# Patient Record
Sex: MALE | Race: WHITE | Employment: FULL TIME | URBAN - METROPOLITAN AREA
[De-identification: names, ages, dates, MRNs, and addresses within clinical notes are randomized per-mention and may not be internally consistent; named-entity substitution may affect disease eponyms.]

---

## 2017-01-02 ENCOUNTER — RADIANT APPOINTMENT (OUTPATIENT)
Dept: GENERAL RADIOLOGY | Facility: CLINIC | Age: 42
End: 2017-01-02
Attending: ORTHOPAEDIC SURGERY
Payer: COMMERCIAL

## 2017-01-02 ENCOUNTER — OFFICE VISIT (OUTPATIENT)
Dept: ORTHOPEDICS | Facility: CLINIC | Age: 42
End: 2017-01-02

## 2017-01-02 ENCOUNTER — THERAPY VISIT (OUTPATIENT)
Dept: OCCUPATIONAL THERAPY | Facility: CLINIC | Age: 42
End: 2017-01-02
Payer: COMMERCIAL

## 2017-01-02 VITALS
SYSTOLIC BLOOD PRESSURE: 118 MMHG | OXYGEN SATURATION: 96 % | BODY MASS INDEX: 31.97 KG/M2 | HEART RATE: 93 BPM | WEIGHT: 236 LBS | DIASTOLIC BLOOD PRESSURE: 84 MMHG | HEIGHT: 72 IN

## 2017-01-02 DIAGNOSIS — S62.323A CLOSED DISPLACED FRACTURE OF SHAFT OF THIRD METACARPAL BONE OF LEFT HAND, INITIAL ENCOUNTER: Primary | ICD-10-CM

## 2017-01-02 DIAGNOSIS — M79.645 PAIN OF FINGER OF LEFT HAND: ICD-10-CM

## 2017-01-02 DIAGNOSIS — M79.642 PAIN OF LEFT HAND: Primary | ICD-10-CM

## 2017-01-02 PROCEDURE — 29130 APPL FINGER SPLINT STATIC: CPT | Mod: GO | Performed by: OCCUPATIONAL THERAPIST

## 2017-01-02 PROCEDURE — 26600 TREAT METACARPAL FRACTURE: CPT | Mod: LT | Performed by: ORTHOPAEDIC SURGERY

## 2017-01-02 PROCEDURE — 97165 OT EVAL LOW COMPLEX 30 MIN: CPT | Mod: GO | Performed by: OCCUPATIONAL THERAPIST

## 2017-01-02 PROCEDURE — 73130 X-RAY EXAM OF HAND: CPT | Mod: LT | Performed by: RADIOLOGY

## 2017-01-02 ASSESSMENT — PAIN SCALES - GENERAL: PAINLEVEL: NO PAIN (0)

## 2017-01-02 NOTE — PROGRESS NOTES
Hand Therapy Initial Evaluation    Current Date:  1/2/2017       Diagnosis: Left 3rd metacarpal fracture  DOI:12/28/16  MD follow up: 1 week       Subjective:  Ramesh Galvez is a 41 year old right hand dominant male.     Patient reports symptoms of pain, stiffness/loss of motion, weakness/loss of strength and edema of the left hand which occurred due to fall onto hand while carrying a recliner down stairs. Since onset symptoms are Unchanged  Special tests:  x-ray at ER and today.  Previous treatment: splinted in ER until today.    General health as reported by patient is good.  Pertinent medical history includes: None noted.  Medical allergies:none.  Surgical history: orthopedic: scaphoid R wrist, femur repair, C7 spinal surgery other: sinuses.  Medication history: anti-inflammatory, anti-depressants, high blood pressure.    Current occupation is unemployed at this time, worked as .  Starting at [employer] soon.    Currently not working.  Job Tasks: computer work    Barriers include:none  Prior functional level:  no limitations    Additional Occupational Profile Information (patterns of daily living, interests, values and needs): all activity with L hand is difficult due to edema, pain, and lack of motion including buttons, dressing, cooking, driving.    Functional Outcome Measure:   Upper Extremity Functional Index Score:  SCORE:   Column Totals: /80: 30   (A lower score indicates greater disability.)    Objective:  Pain Level Report  VAS(0-10) 1/2/2017   At Rest: 1/10   With Use: 7/10      Report of Pain:  Location:  hand  Pain Quality:  Aching and Sharp  Frequency: intermittent    Pain is worst:  daytime or nighttime  Exacerbated by:  motion  Relieved by:  Ibuprofen, pain med at night  Progression:  Unchanged  Edema:  MILD to moderate in dorsal hand and volar thenars  Skin: Bruising noted in volar thenars  Sensation: WNL throughout all nerve distributions; per patient  report    ROM: Not tested, but IP's are WFL    Strength: Not tested, contraindicated    Assessment:  Patient presents with symptoms consistent with diagnosis of Left 3rd metacarpal fracture,  with conservative intervention.     Patient's limitations or Problem List includes:  Pain, Decreased ROM/motion and Increased edema of the left hand which interferes with the patient's ability to perform Self Care Tasks (dressing, eating), Work Tasks, Sleep Patterns, Recreational Activities, Household Chores and Driving  as compared to previous level of function.    Rehab Potential:  Excellent - Return to full activity, no limitations    Patient will benefit from skilled Occupational Therapy to increase stability of fracture with splinting and decrease pain and edema to return to previous activity level and resume normal daily tasks and to reach their rehab potential.    Barriers to Learning:  No barrier    Communication Issues:  Patient appears to be able to clearly communicate and understand verbal and written communication and follow directions correctly.    Assessment of Occupational Performance:  1-3 Performance Deficits  Identified Performance Deficits: dressing, household chores , lifting/carrying     Clinical Decision Making (Complexity): Low complexity    Treatment Explanation:    The following has been discussed with the patient:  RX ordered/plan of care  Anticipated outcomes  Possible risks and side effects    Plan:  Frequency:  1 X week, once daily  Duration:  1 x visit for splint, keep chart open for 4 weeks pending need for additional visits/updated MD order    Treatment Plan:   Orthotic Fabrication:  Static orthosis and Hand based orthosis - per consult with Dr. Haywood (MCP's flexed to ~50/60 degrees, hand-based ok)  Discharge Plan:  Achieve all LTG.  Independent in home treatment program.  Reach maximal therapeutic benefit.    Home Exercise Program:  Splint full time, off for brief hygiene/skin checks  only  Tubigrip sleeve to hand, elevation for edema    Next Visit:  Splint checks as needed  Await any updated MD orders if needed

## 2017-01-02 NOTE — MR AVS SNAPSHOT
After Visit Summary   1/2/2017    Ramesh Galvez    MRN: [id number]           Patient Information     Date Of Birth          1975        Visit Information        Provider Department      1/2/2017 11:45 AM Jennifer Haywood MD [facility]        Today's Diagnoses     Pain of finger of left hand    -  1       Care Instructions      [facility]  [address] [place] [address]  Phone [phone]  Fax [phone]    SURGICAL INFORMATION & INSTRUCTIONS  Dr. Jennifer Haywood  Name of Surgery: Left hand surgery  Date of Surgery: Possibly 1/12/17  A surgery scheduler will contact you within 1 week of your office visit with the surgeon.  If you don't receive a phone call, please call [phone].   Time of Surgery:   Arrival Time:    Please arrive early so that we can prepare you for surgery.  Please note that scheduled times may change.  A nurse will call you before surgery to confirm your instructions.    Location of Surgery:   ?  [facility]  5th Floor   [address] [place] [address]  Phone [phone]  Fax [phone]  [email or web address]  ?  [address] [address]  Phone [phone]  Fax [phone]  [email or web address]    Prior to surgery  ?   Call your insurance company   Ask if you need pre-approval for your surgery.  If pre-approval is needed, please call our surgery scheduler for assistance with the pre-approval process.   If you do not have insurance, please let us know.       ?   Schedule an appointment with a Primary Care Doctor for a Pre-Op Physical.  This should be done within 30 days of surgery  If you do not have a primary care doctor, you may call [facility] at [phone], for an appointment.    Please have your office note and any labs or tests faxed to the facility where you are having surgery.   If your doctor  gives you a copy, please bring it with you on the day of surgery.      Tell your doctor if you have any of the following:   - Allergy to latex or rubber (latex and rubber gloves are often used in medical care).  - If you are taking any medicines (including aspirin or blood thinners), vitamins, or herbal products. You may need to stop taking some medicines before surgery.  - Medical conditions (allergies, diabetes, or heart disease, for example).  - A pacemaker, implanted defibrillator or any other implanted devices.  If you do, please bring the ID card with you on the day of surgery.  - If you use tobacco, quit, or at least cut down.  Smoking delays healing and can increase your risk of infection after surgery.  Ask your doctor how you can quit smoking.    ?   Pre-Op Phone Call  You will receive a pre-op phone call 1-3 days before surgery to review your eating and drinking restrictions, review medications, and confirm surgery times.      ?   7-10 days BEFORE surgery  ? Stop taking aspirin, Plavix or aspirin products 10 days before surgery or as directed by your doctor.    ? Stop taking non-steroidal anti-inflammatory medications (Motrin, Naprosyn, Ibuprofen, Aleve, Advil) 1 week before surgery or as directed by your surgeon.  This will reduce the risk of bleeding during surgery.    ? Stop taking fish oil (Omega-3-fatty acid) 1 week before surgery.    ? It is OK to take acetaminophen (Tylenol) up until 2 hours prior to surgery.    ? Take prescription medications as directed by your doctor.  Discuss which medications to take or hold prior to your surgery, with your primary care doctor.      ? If you have diabetes, ask your primary care doctor or endocrinologist how you should take your medication(s).  ?   24 hours BEFORE surgery    ? Stop drinking alcohol (beer, wine, liquor) at least 24-hours before and after your surgery.     ?   Evening BEFORE surgery      You may eat a normal meal the night before surgery, but eat  nothing after midnight.       Take a shower - to help wash away bacteria (germs) from your skin.  It s normal to have bacteria on your skin and skin protects us from these germs.  When you have surgery, we cut the skin.  Sometimes germs get into the cuts and cause infection (illness caused by germs).  By following the showering instructions and using the special soap, you will lower the number of germs on your skin.  This decreases your chance of an infection.    o Do not shave within 12 inches of your incision (surgical cut) area for at least 3 days before surgery.  Shaving can make small cuts in the skin.  This puts you at a higher risk of infection.  Items you will need for each shower:     Newly washed towel    4 ounces of one of the recommended soaps    Follow these instructions, the evening before surgery       Wash your hair and body with your regular shampoo and soap. Make sure you rinse the shampoo and soap from your hair and body.      Using clean hands, apply about 2 ounces of soap gently on your skin from the neck to your toes. Use on your groin area last. Do not use this soap on your face or head. If you get any soap in your eyes, ears or mouth, rinse right away.       Repeat step 2. It is very important to let the soap stay on your skin for at least 1 minute.       Rinse well and dry off using a clean towel.  If you feel any tingling, itching or other irritation, rinse right away. It is normal to feel some coolness on the skin after using the antiseptic soap. Your skin may feel a bit dry after the shower, but do not use any lotions, creams or moisturizers. Do not use hair spray or other products in your hair.      Dress in freshly washed clothes or pajamas. Use fresh pillowcases and sheets on your bed.    ?   Day of Surgery    If you are very overweight, pregnant, diabetic, have a hiatal hernia or acid reflux, stop drinking clear liquids 6 hours before surgery.      You may drink clear liquids up to 6  hours before surgery or as directed by your surgeon.  Clear liquids include:  Water, Pedialyte, Gatorade, apple juice and liquids you can read through.  Avoid anything orange or red.    Do NOT drink: milk, coffee, liquids that have pulp, orange juice, and lemonade or tomato juice.     Do NOT chew gum, chew tobacco or suck on hard candy.    Take another shower  Follow these instructions:        Wash your hair and body with your regular shampoo and soap. Make sure you rinse the shampoo and soap from your hair and body.      Using clean hands, apply about 2 ounces of soap gently on your skin from the neck to your toes. Use on your groin area last. Do not use this soap on your face or head. If you get any soap in your eyes, ears or mouth, rinse right away.       Repeat step 2. It is very important to let the soap stay on your skin for at least 1 minute.       Rinse well and dry off using a clean towel.  If you feel any tingling, itching or other irritation, rinse right away. It is normal to feel some coolness on the skin after using the antiseptic soap. Your skin may feel a bit dry after the shower, but do not use any lotions, creams or moisturizers. Do not use hair spray or other products in your hair.      Dress in freshly washed clothes.    Do not wear deodorant, cologne, lotion, makeup, nail polish or jewelry to surgery.  ?   If there is any change in your health PRIOR to your surgery, please contact your surgeon's office  Such as a fever, cold, cough, rash, etc     ? Arrange for someone to drive you home after surgery.    will need to be a responsible adult (18 years or older) that will provide transportation to and from surgery and stay in the waiting room during your surgery. You may not drive yourself or take public transportation to and from surgery.    ?   Arrange for someone to stay with you for 24 hours after you go home.   This person must be a responsible adult (18 years or older).    ?   Bring these  items to the hospital/surgery center:   ? Insurance card(s)  ? Money for parking and co-pays, if needed  ? A list of all the medications you take, including vitamins, minerals, herbs and over the counter medications.    ? A copy of your Advance Health Care Directive, if you have one.  This tells us what treatment(s) you would or would not want, and who would make health care decisions, if you could no longer speak for yourself.    ? A case for glasses, contact lenses, hearing aids or dentures.   ? Your inhaler or CPAP machine, if you use these at home    ?   Leave extra cash, jewelry and other valuables at home.       ?   Other information:     Sleep Apnea: Let your nurse know if you have a history of sleep apnea, only if you are having surgery at the [facility].    When you arrive for surgery  When you get to the surgery center/hospital, you will:    Check in. If you are under age 18, you must be with a parent or legal guardian.      Sign consent forms, if you haven t already. These forms state that you know the risks and benefits of surgery. When you sign the forms, you give us permission to do the surgery. Do not sign them unless you understand what will happen during and after your surgery. If you have any questions about your surgery, ask to speak with your doctor before you sign the forms. If you don t understand the answers, ask again.      Receive a copy of the Patient s Bill of Rights. If you do not receive a copy, please ask for one.      Change into hospital clothes. Your belongings will be placed in a bag. We will return them to you after surgery.      Meet with the anesthesia provider. He or she will tell you what kind of anesthesia (medicine) will be used to keep you comfortable during surgery.      Remember: it s okay to remind doctors and nurses to wash their hands before touching you.      In most cases, your surgeon will use a marker to write his or her initials on the surgery site.  This ensures that the exact site is operated on.      For safety reasons, we will ask you the same questions many times. For example, we may ask your name and birth date over and over again.      Friends and family can stay with you until it s time for surgery. While you re in surgery, they will be in the waiting area. Please note that cell phones are not allowed in some patient care areas.      If you have questions about what will happen in the operating room, talk to your care team.      You will meet with an anesthesiologist, before your surgery.  He or she may reference types of anesthesia commonly used for surgeries:     General:  This involves the use of an IV for injection of medication and anesthesia. You are put into a sleep and have a breathing tube to assist you with breathing.    Sedation:  You are asleep, but not so deply that you need a breathing tube.     Local or Regional: a nerve is injected to numb the surgical area.    Spinal: you are numbed from the waist down with medicine injected into your back.    Femoral Nerve Block:  Anesthesia injected into the groin of leg which you are having surgery on.      After surgery  We will move you to a recovery room, where we will watch you closely. If you have any pain or discomfort, tell your nurse. He or she will try to make you comfortable.      If you are staying overnight, we will move you to your hospital room after you are awake.      If you are going home, we will move you to another room. Friends and family may be able to join you. The length of time you spend in recovery depend on the type of medicine you received, your medical condition, the type of surgery you had, or your response to the anesthesia given during your procedure.      When you are discharged from the recovery room, the nurses will review instructions with you and your caregiver.      Please wash your hands every time you touch the wound or change bandages or dressings.      Do not  submerge the wound in water.  You may not use a bathtub or hot tub until the wound is closed. The wait time frame is generally 2-3 weeks, but any open area can be a source of incoming bacteria, so it is better to be on the safe side and avoid water submersion until your wound is fully healed.  Keep all dressings clean and dry.       If you had surgery on your arm or leg, elevate it as much as possible to help reduce swelling.      You may put ice on the surgical area for comfort, keeping ice on area for up to 20 minutes then off for 40 minutes.  You may do this the first few days after surgery to help reduce pain and swelling.        Drink at least 8-10 glasses of liquid each day to help your body heal.      Keep your lungs clear by coughing and taking deep breaths every couple hours.  This is especially important the first 48 hours after surgery.      Notify your doctor if you have any of the following:   o Fever of 101 F or higher  o Numbness and/or tingling  o Increased pain, redness or swelling  o Drainage from wound  o Prolonged or uncontrolled bleeding  o Difficulty with movement    Follow-up Appointments, in Clinic  If you don't already have an appointment scheduled, please call to set up an appointment at [phone].    ?   Post-Op appointments with provider.    ?   Possible Hand therapy appointment [phone]    Dealing with pain  A nurse will check your comfort level often during your stay. He or she will work with you to manage your pain.  It s expected that you will have pain after surgery.  Our goal is to reduce or minimize pain by:     Remember pain is real. There are many ways to control pain. We can help you decide what works best for you.    Ask for pain medicine when you need it.  Don t try to  tough it out --this can make you feel worse. Always take your medicine as ordered.    Medicine doesn t work the same for everyone. If your medicine isn t working, tell your nurse. There may be other  medicines or treatments we can try.    Medication Refills.  If you need refills on your pain medication, please call the clinic as soon as possible.  It may take 72-business hours to obtain a refill.  Refills must be picked up at the [facility] or mailed to a pharmacy of your choice.      It is expected that you will wean off the pain medications in a timely manner.     Constipation is a common side effect of pain medication, decreased activity and anesthesia from surgery.  Take a stool softener as prescribed by your doctor at the time of discharge.  You may also use over the counter medications as needed.  Be sure to increase your fiber (fruits and vegetables) and your water intake.      Please call the clinic at [phone], if you experience any problems or have questions.  If you are having an emergency, always call 911 or seek immediate evaluation at the Emergency Room.    Thank you for selecting the [facility] for your care!  ---------------------------------------------          Follow-ups after your visit        Your next 10 appointments already scheduled     Jan 13, 2017 12:30 PM   AYAN Hand with Arely Granados OT   [facility]    [address]  [address]  [place] [address] [phone]              Who to contact     If you have questions or need follow up information about today's clinic visit or your schedule please contact [facility] directly at [phone].  Normal or non-critical lab and imaging results will be communicated to you by MyChart, letter or phone within 4 business days after the clinic has received the results. If you do not hear from us within 7 days, please contact the clinic through MyChart or phone. If you have a critical or abnormal lab result, we will notify you by phone as soon as possible.  Submit refill requests through [employer] or call your pharmacy and they will forward  the refill request to us. Please allow 3 business days for your refill to be completed.          Additional Information About Your Visit        [employer] Information     [employer] gives you secure access to your electronic health record. If you see a primary care provider, you can also send messages to your care team and make appointments. If you have questions, please call your primary care clinic.  If you do not have a primary care provider, please call [phone] and they will assist you.      [employer] is an electronic gateway that provides easy, online access to your medical records. With [employer], you can request a clinic appointment, read your test results, renew a prescription or communicate with your care team.     To access your existing account, please contact your [facility] or call [phone] for assistance.        Care EveryWhere ID     This is your Care EveryWhere ID. This could be used by other organizations to access your [place] medical records  [id number]        Your Vitals Were     Pulse Height BMI (Body Mass Index) Pulse Oximetry          93 1.829 m (6') 32.00 kg/m2 96%         Blood Pressure from Last 3 Encounters:   01/02/17 118/84   12/30/16 122/87   12/28/16 144/95    Weight from Last 3 Encounters:   01/02/17 107.049 kg (236 lb)   12/30/16 110.133 kg (242 lb 12.8 oz)   12/28/16 109.77 kg (242 lb)               Primary Care Provider Office Phone # Fax #    Linette Naima Vergara [phone]       [facility] [address] [facility] [address]        Thank you!     Thank you for choosing [facility]  for your care. Our goal is always to provide you with excellent care. Hearing back from our patients is one way we can continue to improve our services. Please take a few minutes to complete the written survey that you may receive in the mail after your visit with us. Thank you!             Your Updated Medication  List - Protect others around you: Learn how to safely use, store and throw away your medicines at www.disposemymeds.org.          This list is accurate as of: 1/2/17 11:59 PM.  Always use your most recent med list.                   Brand Name Dispense Instructions for use    amLODIPine 10 MG tablet    NORVASC    90 tablet    Take 1 tablet (10 mg) by mouth daily       cetirizine 10 MG tablet    zyrTEC     Take 10 mg by mouth daily       COENZYME Q-10 PO      Take 100 mg by mouth 2 times daily       DAILY MULTIPLE VITAMINS Tabs      Take by mouth daily       EPINEPHrine 0.3 MG/0.3ML injection    EPIPEN 2-SHERMAN    2 each    Inject 0.3 mLs into the muscle once as needed for anaphylaxis.       escitalopram 10 MG tablet    LEXAPRO    90 tablet    Take 1 tablet (10 mg) by mouth daily       hydrochlorothiazide 12.5 MG capsule    MICROZIDE    90 capsule    Take 1 capsule (12.5 mg) by mouth daily       MS CONTIN PO      Take by mouth every 12 hours       NEXIUM PO      Take 40 mg by mouth every morning (before breakfast) Use over-the-counter.       omeprazole 20 MG CR capsule    priLOSEC         rizatriptan 10 MG tablet    MAXALT    18 tablet    TAKE 1 TABLET BY MOUTH AT ONSET OF HEADACHE FOR MIGRAINE MAY REPEAT DOSE IN 2 HOURS.  DO NOT EXCEED 30 MG IN 24 HOURS

## 2017-01-02 NOTE — PATIENT INSTRUCTIONS
[facility]  [address] [place] [address]  Phone [phone]  Fax [phone]    SURGICAL INFORMATION & INSTRUCTIONS  Dr. Jennifer Haywood  Name of Surgery: Left hand surgery  Date of Surgery: Possibly 1/16/17  A surgery scheduler will contact you within 1 week of your office visit with the surgeon.  If you don't receive a phone call, please call [phone].   Time of Surgery:   Arrival Time:    Please arrive early so that we can prepare you for surgery.  Please note that scheduled times may change.  A nurse will call you before surgery to confirm your instructions.    Location of Surgery:   ?  [facility]  5th Floor   [address] [place] [address]  Phone [phone]  Fax [phone]  [email or web address]  ?  [facility]  [address] [address] [place] [address]  Phone [phone]  Fax [phone]  [email or web address]    Prior to surgery  ?   Call your insurance company   Ask if you need pre-approval for your surgery.  If pre-approval is needed, please call our surgery scheduler for assistance with the pre-approval process.   If you do not have insurance, please let us know.       ?   Schedule an appointment with a Primary Care Doctor for a Pre-Op Physical.  This should be done within 30 days of surgery  If you do not have a primary care doctor, you may call [facility] at [phone], for an appointment.    Please have your office note and any labs or tests faxed to the facility where you are having surgery.   If your doctor gives you a copy, please bring it with you on the day of surgery.      Tell your doctor if you have any of the following:   - Allergy to latex or rubber (latex and rubber gloves are often used in medical care).  - If you are taking any medicines (including aspirin or blood thinners), vitamins, or herbal products. You may need to stop taking some medicines before  surgery.  - Medical conditions (allergies, diabetes, or heart disease, for example).  - A pacemaker, implanted defibrillator or any other implanted devices.  If you do, please bring the ID card with you on the day of surgery.  - If you use tobacco, quit, or at least cut down.  Smoking delays healing and can increase your risk of infection after surgery.  Ask your doctor how you can quit smoking.    ?   Pre-Op Phone Call  You will receive a pre-op phone call 1-3 days before surgery to review your eating and drinking restrictions, review medications, and confirm surgery times.      ?   7-10 days BEFORE surgery  ? Stop taking aspirin, Plavix or aspirin products 10 days before surgery or as directed by your doctor.    ? Stop taking non-steroidal anti-inflammatory medications (Motrin, Naprosyn, Ibuprofen, Aleve, Advil) 1 week before surgery or as directed by your surgeon.  This will reduce the risk of bleeding during surgery.    ? Stop taking fish oil (Omega-3-fatty acid) 1 week before surgery.    ? It is OK to take acetaminophen (Tylenol) up until 2 hours prior to surgery.    ? Take prescription medications as directed by your doctor.  Discuss which medications to take or hold prior to your surgery, with your primary care doctor.      ? If you have diabetes, ask your primary care doctor or endocrinologist how you should take your medication(s).  ?   24 hours BEFORE surgery    ? Stop drinking alcohol (beer, wine, liquor) at least 24-hours before and after your surgery.     ?   Evening BEFORE surgery      You may eat a normal meal the night before surgery, but eat nothing after midnight.       Take a shower - to help wash away bacteria (germs) from your skin.  It s normal to have bacteria on your skin and skin protects us from these germs.  When you have surgery, we cut the skin.  Sometimes germs get into the cuts and cause infection (illness caused by germs).  By following the showering instructions and using the special  soap, you will lower the number of germs on your skin.  This decreases your chance of an infection.    o Do not shave within 12 inches of your incision (surgical cut) area for at least 3 days before surgery.  Shaving can make small cuts in the skin.  This puts you at a higher risk of infection.  Items you will need for each shower:     Newly washed towel    4 ounces of one of the recommended soaps    Follow these instructions, the evening before surgery       Wash your hair and body with your regular shampoo and soap. Make sure you rinse the shampoo and soap from your hair and body.      Using clean hands, apply about 2 ounces of soap gently on your skin from the neck to your toes. Use on your groin area last. Do not use this soap on your face or head. If you get any soap in your eyes, ears or mouth, rinse right away.       Repeat step 2. It is very important to let the soap stay on your skin for at least 1 minute.       Rinse well and dry off using a clean towel.  If you feel any tingling, itching or other irritation, rinse right away. It is normal to feel some coolness on the skin after using the antiseptic soap. Your skin may feel a bit dry after the shower, but do not use any lotions, creams or moisturizers. Do not use hair spray or other products in your hair.      Dress in freshly washed clothes or pajamas. Use fresh pillowcases and sheets on your bed.    ?   Day of Surgery    If you are very overweight, pregnant, diabetic, have a hiatal hernia or acid reflux, stop drinking clear liquids 6 hours before surgery.      You may drink clear liquids up to 6 hours before surgery or as directed by your surgeon.  Clear liquids include:  Water, Pedialyte, Gatorade, apple juice and liquids you can read through.  Avoid anything orange or red.    Do NOT drink: milk, coffee, liquids that have pulp, orange juice, and lemonade or tomato juice.     Do NOT chew gum, chew tobacco or suck on hard candy.    Take another  shower  Follow these instructions:        Wash your hair and body with your regular shampoo and soap. Make sure you rinse the shampoo and soap from your hair and body.      Using clean hands, apply about 2 ounces of soap gently on your skin from the neck to your toes. Use on your groin area last. Do not use this soap on your face or head. If you get any soap in your eyes, ears or mouth, rinse right away.       Repeat step 2. It is very important to let the soap stay on your skin for at least 1 minute.       Rinse well and dry off using a clean towel.  If you feel any tingling, itching or other irritation, rinse right away. It is normal to feel some coolness on the skin after using the antiseptic soap. Your skin may feel a bit dry after the shower, but do not use any lotions, creams or moisturizers. Do not use hair spray or other products in your hair.      Dress in freshly washed clothes.    Do not wear deodorant, cologne, lotion, makeup, nail polish or jewelry to surgery.  ?   If there is any change in your health PRIOR to your surgery, please contact your surgeon's office  Such as a fever, cold, cough, rash, etc     ? Arrange for someone to drive you home after surgery.    will need to be a responsible adult (18 years or older) that will provide transportation to and from surgery and stay in the waiting room during your surgery. You may not drive yourself or take public transportation to and from surgery.    ?   Arrange for someone to stay with you for 24 hours after you go home.   This person must be a responsible adult (18 years or older).    ?   Bring these items to the hospital/surgery center:   ? Insurance card(s)  ? Money for parking and co-pays, if needed  ? A list of all the medications you take, including vitamins, minerals, herbs and over the counter medications.    ? A copy of your Advance Health Care Directive, if you have one.  This tells us what treatment(s) you would or would not want, and who  would make health care decisions, if you could no longer speak for yourself.    ? A case for glasses, contact lenses, hearing aids or dentures.   ? Your inhaler or CPAP machine, if you use these at home    ?   Leave extra cash, jewelry and other valuables at home.       ?   Other information:     Sleep Apnea: Let your nurse know if you have a history of sleep apnea, only if you are having surgery at the [facility].    When you arrive for surgery  When you get to the surgery center/hospital, you will:    Check in. If you are under age 18, you must be with a parent or legal guardian.      Sign consent forms, if you haven t already. These forms state that you know the risks and benefits of surgery. When you sign the forms, you give us permission to do the surgery. Do not sign them unless you understand what will happen during and after your surgery. If you have any questions about your surgery, ask to speak with your doctor before you sign the forms. If you don t understand the answers, ask again.      Receive a copy of the Patient s Bill of Rights. If you do not receive a copy, please ask for one.      Change into hospital clothes. Your belongings will be placed in a bag. We will return them to you after surgery.      Meet with the anesthesia provider. He or she will tell you what kind of anesthesia (medicine) will be used to keep you comfortable during surgery.      Remember: it s okay to remind doctors and nurses to wash their hands before touching you.      In most cases, your surgeon will use a marker to write his or her initials on the surgery site. This ensures that the exact site is operated on.      For safety reasons, we will ask you the same questions many times. For example, we may ask your name and birth date over and over again.      Friends and family can stay with you until it s time for surgery. While you re in surgery, they will be in the waiting area. Please note that cell phones are  not allowed in some patient care areas.      If you have questions about what will happen in the operating room, talk to your care team.      You will meet with an anesthesiologist, before your surgery.  He or she may reference types of anesthesia commonly used for surgeries:     General:  This involves the use of an IV for injection of medication and anesthesia. You are put into a sleep and have a breathing tube to assist you with breathing.    Sedation:  You are asleep, but not so deply that you need a breathing tube.     Local or Regional: a nerve is injected to numb the surgical area.    Spinal: you are numbed from the waist down with medicine injected into your back.    Femoral Nerve Block:  Anesthesia injected into the groin of leg which you are having surgery on.      After surgery  We will move you to a recovery room, where we will watch you closely. If you have any pain or discomfort, tell your nurse. He or she will try to make you comfortable.      If you are staying overnight, we will move you to your hospital room after you are awake.      If you are going home, we will move you to another room. Friends and family may be able to join you. The length of time you spend in recovery depend on the type of medicine you received, your medical condition, the type of surgery you had, or your response to the anesthesia given during your procedure.      When you are discharged from the recovery room, the nurses will review instructions with you and your caregiver.      Please wash your hands every time you touch the wound or change bandages or dressings.      Do not submerge the wound in water.  You may not use a bathtub or hot tub until the wound is closed. The wait time frame is generally 2-3 weeks, but any open area can be a source of incoming bacteria, so it is better to be on the safe side and avoid water submersion until your wound is fully healed.  Keep all dressings clean and dry.       If you had surgery  on your arm or leg, elevate it as much as possible to help reduce swelling.      You may put ice on the surgical area for comfort, keeping ice on area for up to 20 minutes then off for 40 minutes.  You may do this the first few days after surgery to help reduce pain and swelling.        Drink at least 8-10 glasses of liquid each day to help your body heal.      Keep your lungs clear by coughing and taking deep breaths every couple hours.  This is especially important the first 48 hours after surgery.      Notify your doctor if you have any of the following:   o Fever of 101 F or higher  o Numbness and/or tingling  o Increased pain, redness or swelling  o Drainage from wound  o Prolonged or uncontrolled bleeding  o Difficulty with movement    Follow-up Appointments, in Clinic  If you don't already have an appointment scheduled, please call to set up an appointment at [phone].    ?   Post-Op appointments with provider.    ?   Possible Hand therapy appointment [phone]    Dealing with pain  A nurse will check your comfort level often during your stay. He or she will work with you to manage your pain.  It s expected that you will have pain after surgery.  Our goal is to reduce or minimize pain by:     Remember pain is real. There are many ways to control pain. We can help you decide what works best for you.    Ask for pain medicine when you need it.  Don t try to  tough it out --this can make you feel worse. Always take your medicine as ordered.    Medicine doesn t work the same for everyone. If your medicine isn t working, tell your nurse. There may be other medicines or treatments we can try.    Medication Refills.  If you need refills on your pain medication, please call the clinic as soon as possible.  It may take 72-business hours to obtain a refill.  Refills must be picked up at the [facility] or mailed to a pharmacy of your choice.      It is expected that you will wean off the  pain medications in a timely manner.     Constipation is a common side effect of pain medication, decreased activity and anesthesia from surgery.  Take a stool softener as prescribed by your doctor at the time of discharge.  You may also use over the counter medications as needed.  Be sure to increase your fiber (fruits and vegetables) and your water intake.      Please call the clinic at [phone], if you experience any problems or have questions.  If you are having an emergency, always call 911 or seek immediate evaluation at the Emergency Room.    Thank you for selecting the [facility] for your care!  ---------------------------------------------

## 2017-01-09 ENCOUNTER — MYC MEDICAL ADVICE (OUTPATIENT)
Dept: ORTHOPEDICS | Facility: CLINIC | Age: 42
End: 2017-01-09

## 2017-01-09 DIAGNOSIS — S62.303A: Primary | ICD-10-CM

## 2017-01-10 ENCOUNTER — TELEPHONE (OUTPATIENT)
Dept: ORTHOPEDICS | Facility: CLINIC | Age: 42
End: 2017-01-10

## 2017-01-10 NOTE — TELEPHONE ENCOUNTER
Last OV not completed from 1-2-17. Responded to patients my chart message to verify reason for call/splint issues.

## 2017-01-10 NOTE — TELEPHONE ENCOUNTER
[facility] Call Center    Phone Message    Name of Caller: Lopez    Phone Number: Home number on file [phone] (home)    Best time to return call: Any    May a detailed message be left on voicemail: yes    Relation to patient: Self    Reason for Call: Other: Patient is calling requesting a call back from Nghia in clinic in regards to the splint that he has been wearing onthe left hand and to update the clinic on how his hand is healing. Please call back to discuss.      Action Taken: Message routed to:  Adult Clinics: Orthopedics p [id number]

## 2017-01-12 NOTE — TELEPHONE ENCOUNTER
"Pt called back, modified splints a little to push on back of hand more, he believed this helped.   Pt not concerned about the \"twist\" of the bone.   Pt concerned about bone has shortened too far.     Discussed with Pt that although the knuckle/hand may look to be shortened by more than a few mm, an xray would be needed to verify and if not actually shortened (per XR), then its likely that Dr. Haywood would not recommend surgery.  Pt was understanding of this.  Also discussed that we would prepare for possible surg (get pre-op, hold meds, NPO after midnight, etc) just in case Dr. Haywood is ok with doing surgery on Monday.   Emailed Pt pre-op packet and he already has surg soap at home for pre-op showers. Called and went over pre-op packet.  Messaged Criss (surg sched) to update her on situation as well, she will check with both surgery centers.    Nghia Grant RN   "

## 2017-01-12 NOTE — TELEPHONE ENCOUNTER
Left VM with callback to discuss most recent mychart regarding scheduling an appt and surgery for this upcoming Monday (1/16/17).   LOV not finished.    Sent message to Dr. Haywood.   At this time we will advise Pt to be NPO after midnight in preparation for possible surgery on Monday.   Will await more information from Dr. Haywood.    Nghia Grant RN

## 2017-01-13 ENCOUNTER — THERAPY VISIT (OUTPATIENT)
Dept: OCCUPATIONAL THERAPY | Facility: CLINIC | Age: 42
End: 2017-01-13
Payer: COMMERCIAL

## 2017-01-13 ENCOUNTER — OFFICE VISIT (OUTPATIENT)
Dept: FAMILY MEDICINE | Facility: CLINIC | Age: 42
End: 2017-01-13
Payer: COMMERCIAL

## 2017-01-13 ENCOUNTER — RADIANT APPOINTMENT (OUTPATIENT)
Dept: GENERAL RADIOLOGY | Facility: CLINIC | Age: 42
End: 2017-01-13
Attending: ORTHOPAEDIC SURGERY
Payer: COMMERCIAL

## 2017-01-13 VITALS
DIASTOLIC BLOOD PRESSURE: 78 MMHG | SYSTOLIC BLOOD PRESSURE: 100 MMHG | OXYGEN SATURATION: 95 % | HEIGHT: 72 IN | TEMPERATURE: 97.8 F | WEIGHT: 238 LBS | HEART RATE: 98 BPM | BODY MASS INDEX: 32.23 KG/M2

## 2017-01-13 DIAGNOSIS — Z01.818 PREOP GENERAL PHYSICAL EXAM: Primary | ICD-10-CM

## 2017-01-13 DIAGNOSIS — M79.642 PAIN OF LEFT HAND: Primary | ICD-10-CM

## 2017-01-13 DIAGNOSIS — Z98.1 STATUS POST CERVICAL SPINAL FUSION: ICD-10-CM

## 2017-01-13 DIAGNOSIS — E78.2 HYPERLIPEMIA, MIXED: ICD-10-CM

## 2017-01-13 DIAGNOSIS — S62.303A: ICD-10-CM

## 2017-01-13 DIAGNOSIS — R29.898 MECHANICAL PROBLEMS WITH LIMBS: ICD-10-CM

## 2017-01-13 DIAGNOSIS — S62.303K: ICD-10-CM

## 2017-01-13 DIAGNOSIS — I10 HYPERTENSION GOAL BP (BLOOD PRESSURE) < 140/90: ICD-10-CM

## 2017-01-13 DIAGNOSIS — F33.1 MAJOR DEPRESSIVE DISORDER, RECURRENT EPISODE, MODERATE (H): ICD-10-CM

## 2017-01-13 LAB
ALBUMIN SERPL-MCNC: 4.6 G/DL (ref 3.4–5)
ANION GAP SERPL CALCULATED.3IONS-SCNC: 11 MMOL/L (ref 3–14)
BUN SERPL-MCNC: 18 MG/DL (ref 7–30)
CALCIUM SERPL-MCNC: 9.7 MG/DL (ref 8.5–10.1)
CHLORIDE SERPL-SCNC: 103 MMOL/L (ref 94–109)
CHOLEST SERPL-MCNC: 226 MG/DL
CO2 SERPL-SCNC: 24 MMOL/L (ref 20–32)
CREAT SERPL-MCNC: 1 MG/DL (ref 0.66–1.25)
ERYTHROCYTE [DISTWIDTH] IN BLOOD BY AUTOMATED COUNT: 13.1 % (ref 10–15)
GFR SERPL CREATININE-BSD FRML MDRD: 82 ML/MIN/1.7M2
GLUCOSE SERPL-MCNC: 101 MG/DL (ref 70–99)
HCT VFR BLD AUTO: 45.3 % (ref 40–53)
HDLC SERPL-MCNC: 43 MG/DL
HGB BLD-MCNC: 15.5 G/DL (ref 13.3–17.7)
LDLC SERPL DIRECT ASSAY-MCNC: 135 MG/DL
MCH RBC QN AUTO: 29 PG (ref 26.5–33)
MCHC RBC AUTO-ENTMCNC: 34.2 G/DL (ref 31.5–36.5)
MCV RBC AUTO: 85 FL (ref 78–100)
PHOSPHATE SERPL-MCNC: 2.7 MG/DL (ref 2.5–4.5)
PLATELET # BLD AUTO: 246 10E9/L (ref 150–450)
POTASSIUM SERPL-SCNC: 3.8 MMOL/L (ref 3.4–5.3)
RBC # BLD AUTO: 5.35 10E12/L (ref 4.4–5.9)
SODIUM SERPL-SCNC: 138 MMOL/L (ref 133–144)
WBC # BLD AUTO: 7.1 10E9/L (ref 4–11)

## 2017-01-13 PROCEDURE — 99214 OFFICE O/P EST MOD 30 MIN: CPT | Performed by: FAMILY MEDICINE

## 2017-01-13 PROCEDURE — 83721 ASSAY OF BLOOD LIPOPROTEIN: CPT | Performed by: FAMILY MEDICINE

## 2017-01-13 PROCEDURE — 80069 RENAL FUNCTION PANEL: CPT | Performed by: FAMILY MEDICINE

## 2017-01-13 PROCEDURE — 85027 COMPLETE CBC AUTOMATED: CPT | Performed by: FAMILY MEDICINE

## 2017-01-13 PROCEDURE — 83718 ASSAY OF LIPOPROTEIN: CPT | Performed by: FAMILY MEDICINE

## 2017-01-13 PROCEDURE — 73130 X-RAY EXAM OF HAND: CPT | Mod: LT | Performed by: RADIOLOGY

## 2017-01-13 PROCEDURE — 36415 COLL VENOUS BLD VENIPUNCTURE: CPT | Performed by: FAMILY MEDICINE

## 2017-01-13 PROCEDURE — 97760 ORTHOTIC MGMT&TRAING 1ST ENC: CPT | Mod: GO | Performed by: OCCUPATIONAL THERAPIST

## 2017-01-13 PROCEDURE — 82465 ASSAY BLD/SERUM CHOLESTEROL: CPT | Performed by: FAMILY MEDICINE

## 2017-01-13 ASSESSMENT — ANXIETY QUESTIONNAIRES
3. WORRYING TOO MUCH ABOUT DIFFERENT THINGS: NOT AT ALL
2. NOT BEING ABLE TO STOP OR CONTROL WORRYING: NOT AT ALL
6. BECOMING EASILY ANNOYED OR IRRITABLE: MORE THAN HALF THE DAYS
7. FEELING AFRAID AS IF SOMETHING AWFUL MIGHT HAPPEN: NOT AT ALL
5. BEING SO RESTLESS THAT IT IS HARD TO SIT STILL: SEVERAL DAYS
GAD7 TOTAL SCORE: 4
1. FEELING NERVOUS, ANXIOUS, OR ON EDGE: NOT AT ALL
IF YOU CHECKED OFF ANY PROBLEMS ON THIS QUESTIONNAIRE, HOW DIFFICULT HAVE THESE PROBLEMS MADE IT FOR YOU TO DO YOUR WORK, TAKE CARE OF THINGS AT HOME, OR GET ALONG WITH OTHER PEOPLE: SOMEWHAT DIFFICULT

## 2017-01-13 ASSESSMENT — PATIENT HEALTH QUESTIONNAIRE - PHQ9: 5. POOR APPETITE OR OVEREATING: SEVERAL DAYS

## 2017-01-13 ASSESSMENT — PAIN SCALES - GENERAL: PAINLEVEL: NO PAIN (1)

## 2017-01-13 NOTE — TELEPHONE ENCOUNTER
Per huddle with Dr. Haywood, Pt to get XR today after hand therapy, come on Monday here at  to be seen by Dr. Haywood and she will decide if surgery is needed.   Tentative surgery Tuesday AM (1/17/17). Relayed plan to Criss (surg sched).  Pt agreeable to getting XR and to coming in on Monday.    Nghia Grant RN

## 2017-01-13 NOTE — NURSING NOTE
Chief Complaint   Patient presents with     Pre-Op Exam     Non fasting       Initial /78 mmHg  Pulse 98  Temp(Src) 97.8  F (36.6  C) (Oral)  Ht 6' (1.829 m)  Wt 238 lb (107.956 kg)  BMI 32.27 kg/m2  SpO2 95% Estimated body mass index is 32.27 kg/(m^2) as calculated from the following:    Height as of this encounter: 6' (1.829 m).    Weight as of this encounter: 238 lb (107.956 kg).  BP completed using cuff size: rhett Diaz CMA

## 2017-01-13 NOTE — PATIENT INSTRUCTIONS
Before Your Surgery      Call your surgeon if there is any change in your health. This includes signs of a cold or flu (such as a sore throat, runny nose, cough, rash or fever).    Do not smoke, drink alcohol or take over the counter medicine (unless your surgeon or primary care doctor tells you to) for the 24 hours before and after surgery.    If you take prescribed drugs: Follow your doctor s orders about which medicines to take and which to stop until after surgery.    Eating and drinking prior to surgery: follow the instructions from your surgeon    Take a shower or bath the night before surgery. Use the soap your surgeon gave you to gently clean your skin. If you do not have soap from your surgeon, use your regular soap. Do not shave or scrub the surgery site.  Wear clean pajamas and have clean sheets on your bed.     How to contact your care team: [phone] Pharmacy [phone]   ZINA CRHIS MD KATYA GEORGIEV, PA-C CHRIS JONES, PA-C NAM HO, MD JONATHAN BATES, MD ARVIN VOCAL, MD    Clinic hours M-Th 7am-7pm Fri 7am-5pm.   Urgent care M-F 11am-9pm  Sat/Sun 9am-5pm.   Pharmacy   Mon-Th:  8:00am-8pm   Fri:  8:00am-6:00pm  Sat/Sun  8:00am-5:00 pm

## 2017-01-13 NOTE — PROGRESS NOTES
[address] [address]  [phone]  Dept: [phone]    PRE-OP EVALUATION:  Today's date: 2017    Ramesh Galvez (: 1975) presents for pre-operative evaluation assessment as requested by Dr. Jennifer Haywood.  He requires evaluation and anesthesia risk assessment prior to undergoing surgery/procedure for treatment of fracture of third metacarpal bone of left hand.  Proposed procedure: Repair of third metacarpal bone of left hand    Date of Surgery/ Procedure: 17 Tentative  Time of Surgery/ Procedure: To be determined  Hospital/Surgical Facility: [facility]  Primary Physician: Linette Vergara  Type of Anesthesia Anticipated: Unknown    Patient has a Health Care Directive or Living Will:  NO    1. NO - Do you have a history of heart attack, stroke, stent, bypass or surgery on an artery in the head, neck, heart or legs?  2. NO - DO YOU EVER HAVE ANY PAIN OR DISCOMFORT IN YOUR CHEST?   2. NO - Do you ever have any pain or discomfort in your chest?  3. NO - Do you have a history of  Heart Failure?  4. NO - Are you troubled by shortness of breath when: walking on the level, up a slight hill or at night?  5. NO - Do you currently have a cold, bronchitis or other respiratory infection?  6. NO - Do you have a cough, shortness of breath or wheezing?  7. NO - Do you sometimes get pains in the calves of your legs when you walk?  8. NO - Do you or anyone in your family have previous history of blood clots?  9. NO - Do you or does anyone in your family have a serious bleeding problem such as prolonged bleeding following surgeries or cuts?  10. NO - Have you ever had problems with anemia or been told to take iron pills?  11. NO - Have you had any abnormal blood loss such as black, tarry or bloody stools, or abnormal vaginal bleeding?  12. NO - Have you ever had a blood transfusion?  13. NO - Have you or any of your relatives ever had  problems with anesthesia?  14. YES - Do you have sleep apnea, excessive snoring or daytime drowsiness? Sleep apnea symptoms but no sleep study and does not use CPAP  15. NO - Do you have any prosthetic heart valves?  16. NO - Do you have prosthetic joints?  17. NO - Is there any chance that you may be pregnant?      HPI:                                                      Brief HPI related to upcoming procedure: Fell slipping down steps while moving a recliner down the stairs. Landed on left hand. Right handed.       HYPERTENSION - Patient has longstanding history of mod-severe HTN , currently denies any symptoms referable to elevated blood pressure. Specifically denies chest pain, palpitations, dyspnea, orthopnea, PND or peripheral edema. Blood pressure readings have been in normal range. Current medication regimen is as listed below. Patient denies any side effects of medication.                                                                                                                                                                                          .  HYPERLIPIDEMIA - Patient has a long history of significant Hyperlipidemia requiring medication for treatment with recent good control. Patient reports no problems or side effects with the medication.                                                                                                                                                       .  DEPRESSION - Patient has a long history of Depression of moderate severity requiring medication for control with recent symptoms being stable..Current symptoms of depression include none.                                                                                                                                                                                    .    MEDICAL HISTORY:                                                      Patient Active Problem List    Diagnosis Date Noted     Mechanical  problems with limbs 01/13/2017     Priority: Medium     Closed displaced fracture of third metacarpal bone of left hand with nonunion, unspecified portion of metacarpal, subsequent encounter 01/13/2017     Priority: Medium     Pain of left hand 01/02/2017     Priority: Medium     Chronic pain due to trauma 12/31/2016     Priority: Medium     Improved after neck fusion. Patient is followed by Linette Vergara MD for ongoing prescription of pain medication.  All refills should only be approved by this provider, or covering partner.    Medication(s):hydrocodone acetaminophen 5/325 mg .   Maximum quantity per month: 60  Clinic visit frequency required: Q 3 months     Controlled substance agreement:  Encounter-Level CSA:     There are no encounter-level csa.        Pain Clinic evaluation in the past: Yes       Date/Location:  Spine specialist with surgery for spine fusion.     DIRE Total Score(s):  No flowsheet data found.    Last [facility] website verification:  none   [email or web address]              Major depressive disorder, recurrent episode, moderate (H) 07/22/2016     Priority: Medium     Status post cervical spinal fusion 05/16/2016     Priority: Medium     Cervical stenosis of spine 03/22/2016     Priority: Medium     Compression fracture of C-spine, sequela 02/24/2016     Priority: Medium     Gastroesophageal reflux disease with esophagitis 05/18/2015     Priority: Medium     History of femur fracture 03/28/2014     Priority: Medium     Titanium leyla right femur       Moderate major depression (H) 05/09/2013     Priority: Medium     Hyperlipemia, mixed 12/18/2012     Priority: Medium     Migraine headache 09/19/2011     Priority: Medium     (Problem list name updated by automated process. Provider to review and confirm.)       CARDIOVASCULAR SCREENING; LDL GOAL LESS THAN 160 10/31/2010     Priority: Medium     Hypertension goal BP (blood pressure) < 140/90 07/29/2010     Priority: Medium      Dermatophytosis of body 09/20/2007     Priority: Medium     Chronic recurrent tinea versicolor  Problem list name updated by automated process. Provider to review       Allergic rhinitis 01/18/2007     Priority: Medium     Problem list name updated by automated process. Provider to review       Chronic maxillary sinusitis 01/18/2007     Priority: Medium     Chronic Recurrent        Past Medical History   Diagnosis Date     ALLERGIC RHINITIS NOS 1/18/2007     CHR MAXILLARY SINUSITIS 1/18/2007     Chronic Recurrent     DERMATOPHYTOSIS OF BODY 9/20/2007     Chronic recurrent tinea versicolor     HTN (hypertension) 7/29/2010     Migraines      Other chronic pain      neck, between shoulder blades more on right side, right arm     Past Surgical History   Procedure Laterality Date     Hernia repair, inguinal rt/lt  Age 9 years     Right     Hc nasal/sinus scope w ther instill       C arthroplasty wrist jt       R wrist graft from hip secondary to traumatic fracture     Orthopedic surgery       right femur surgery     Hc open tx carpal scaphoid navicular fracture Right      Discectomy, fusion cervical anterior one level, combined Right 3/22/2016     Procedure: COMBINED DISCECTOMY, FUSION CERVICAL ANTERIOR ONE LEVEL;  Surgeon: Fidencio Santacruz MD;  Location: [facility] OR     Current Outpatient Prescriptions   Medication Sig Dispense Refill     Morphine Sulfate (MS CONTIN PO) Take by mouth every 12 hours       omeprazole (PRILOSEC) 20 MG capsule   3     amLODIPine (NORVASC) 10 MG tablet Take 1 tablet (10 mg) by mouth daily 90 tablet 3     hydrochlorothiazide (MICROZIDE) 12.5 MG capsule Take 1 capsule (12.5 mg) by mouth daily 90 capsule 3     escitalopram (LEXAPRO) 10 MG tablet Take 1 tablet (10 mg) by mouth daily 90 tablet 3     rizatriptan (MAXALT) 10 MG tablet TAKE 1 TABLET BY MOUTH AT ONSET OF HEADACHE FOR MIGRAINE MAY REPEAT DOSE IN 2 HOURS.  DO NOT EXCEED 30 MG IN 24 HOURS 18 tablet 6     DAILY MULTIPLE VITAMINS TABS Take  by mouth daily       cetirizine (ZYRTEC) 10 MG tablet Take 10 mg by mouth daily       COENZYME Q-10 PO Take 100 mg by mouth 2 times daily       EPINEPHrine (EPIPEN 2-SHERMAN) 0.3 MG/0.3ML injection Inject 0.3 mLs into the muscle once as needed for anaphylaxis. 2 each 1     OTC products: None, except as noted above    Allergies   Allergen Reactions     Toradol Unknown     Wasps [Hornets] Hives, Itching and Swelling     Demerol Nausea and Vomiting     Sporanox [Imidazole Antifungals] Hives      Latex Allergy: NO    Social History   Substance Use Topics     Smoking status: Never Smoker      Smokeless tobacco: Never Used     Alcohol Use: 0.0 oz/week     0 Standard drinks or equivalent per week      Comment: social etoh     History   Drug Use No       REVIEW OF SYSTEMS:                                                    Constitutional, neuro, ENT, endocrine, pulmonary, cardiac, gastrointestinal, genitourinary, musculoskeletal, integument and psychiatric systems are negative, except as otherwise noted.    EXAM:                                                    /78 mmHg  Pulse 98  Temp(Src) 97.8  F (36.6  C) (Oral)  Ht 6' (1.829 m)  Wt 238 lb (107.956 kg)  BMI 32.27 kg/m2  SpO2 95%    GENERAL APPEARANCE: healthy, alert and no distress     EYES: EOMI, - PERRL     HENT: ear canals and TM's normal and nose and mouth without ulcers or lesions     NECK: no adenopathy, no asymmetry, masses, or scars and thyroid normal to palpation     RESP: lungs clear to auscultation - no rales, rhonchi or wheezes     CV: regular rates and rhythm, normal S1 S2, no S3 or S4 and no murmur, click or rub -     ABDOMEN:  soft, nontender, no HSM or masses and bowel sounds normal     MS: extremities normal- no gross deformities noted, no evidence of inflammation in joints, FROM in all extremities.     SKIN: no suspicious lesions or rashes     NEURO: Normal strength and tone, sensory exam grossly normal, mentation intact and speech normal      PSYCH: mentation appears normal. and affect normal/bright     LYMPHATICS: No axillary, cervical, inguinal, or supraclavicular nodes    DIAGNOSTICS:                                                    EKG: Not indicated due to non-vascular surgery and low risk of event (age <65 and without cardiac risk factors)    Recent Labs   Lab Test  03/23/16   0744  03/22/16   1113  03/15/16   1105   HGB  12.3*  13.3  15.0   PLT  182   --   216   INR  1.06   --   0.98   NA  140  139  140   POTASSIUM  3.5  3.8  3.5   CR  0.99   --   1.08        IMPRESSION:                                                    Reason for surgery/procedure: fracture of third metacarpal bone of left hand.  Proposed procedure: Repair of third metacarpal bone of left hand  Diagnosis/reason for consult: cardiac and anesthesia risk assessment     The proposed surgical procedure is considered LOW risk.    REVISED CARDIAC RISK INDEX  The patient has the following serious cardiovascular risks for perioperative complications such as (MI, PE, VFib and 3  AV Block):  No serious cardiac risks  INTERPRETATION: 0 risks: Class I (very low risk - 0.4% complication rate)    The patient has the following additional risks for perioperative complications:  No identified additional risks      ICD-10-CM    1. Preop general physical exam Z01.818    2. Hypertension goal BP (blood pressure) < 140/90 I10 CBC with platelets     Renal panel   3. Major depressive disorder, recurrent episode, moderate (H) F33.1    4. Status post cervical spinal fusion Z98.1    5. Closed displaced fracture of third metacarpal bone of left hand with nonunion, unspecified portion of metacarpal, subsequent encounter S62.303K    6. Hyperlipemia, mixed E78.2 LDL cholesterol direct     HDL cholesterol     Cholesterol       RECOMMENDATIONS:                                                          --Patient is to take all scheduled medications on the day of surgery EXCEPT for modifications listed  below.    APPROVAL GIVEN to proceed with proposed procedure, without further diagnostic evaluation       Signed Electronically by: Linette Vergara MD    Copy of this evaluation report is provided to requesting physician.    [place] Preop Guidelines

## 2017-01-13 NOTE — PROGRESS NOTES
"Hand Therapy Soap Note    Current Date:  1/13/2017       Diagnosis: Left 3rd metacarpal fracture  DOI:12/28/16  MD follow up: 1/16/17       Subjective:  \"I've worn the splint pretty much full time, except for bathing or handwashing; but I think it's loose now since my swelling has come down so much.\"    Functional Outcome Measure:   Upper Extremity Functional Index Score:  SCORE:   Column Totals: /80: 30   (A lower score indicates greater disability.)    Objective:  Pain Level Report  VAS(0-10) 1/2/2017 1/13/17   At Rest: 1/10 0/10   With Use: 7/10  1/10, but up to 4-5/10 by end of day     Report of Pain:  Location:  hand  Pain Quality:  Aching and Sharp  Frequency: intermittent    Pain is worst:  daytime or nighttime  Exacerbated by:  motion  Relieved by:  Ibuprofen, pain med at night  Progression:  Unchanged  Edema:  Minimal in dorsal hand, much improved  Skin: Bruising has resolved  Sensation: WNL throughout all nerve distributions; per patient report    ROM: Not formally assessed, but minimal stiffness noted in finger joints of R hand, pt able to touch palm with IF-SF when assessing stability of fracture in session.  Long finger remains neutral in alignment with flat fist completion.  Slight radial angulation of long finger observed volarly/not dorsally when in full extension.    Strength: Not tested, contraindicated    Assessment:  Patient presents with symptoms consistent with diagnosis of Left 3rd metacarpal fracture,  with conservative intervention.     Patient's limitations or Problem List includes:  Pain, Decreased ROM/motion and Increased edema of the left hand which interferes with the patient's ability to perform Self Care Tasks (dressing, eating), Work Tasks, Sleep Patterns, Recreational Activities, Household Chores and Driving  as compared to previous level of function.    Rehab Potential:  Excellent - Return to full activity, no limitations    Patient will benefit from skilled Occupational Therapy to " increase stability of fracture with splinting and decrease pain and edema to return to previous activity level and resume normal daily tasks and to reach their rehab potential.    Barriers to Learning:  No barrier    Communication Issues:  Patient appears to be able to clearly communicate and understand verbal and written communication and follow directions correctly.    Assessment of Occupational Performance:  1-3 Performance Deficits  Identified Performance Deficits: dressing, household chores , lifting/carrying     Clinical Decision Making (Complexity): Low complexity    Treatment Explanation:    The following has been discussed with the patient:  RX ordered/plan of care  Anticipated outcomes  Possible risks and side effects    Plan:  Frequency:  1 X week, once daily  Duration:  2 visits    Treatment Plan:   Orthotic Fabrication:    Static orthosis and Hand based orthosis - per consult with Dr. Haywood (MCP's flexed to ~50/60 degrees, hand-based ok)  Orthotic Revision    Discharge Plan:  Achieve all LTG.  Independent in home treatment program.  Reach maximal therapeutic benefit.    Home Exercise Program:  Splint full time, off for brief hygiene/skin checks only  Tubigrip sleeve to hand, elevation for edema    Next Visit:  Splint checks as needed  Await any updated MD orders if needed

## 2017-01-14 ASSESSMENT — ANXIETY QUESTIONNAIRES: GAD7 TOTAL SCORE: 4

## 2017-01-14 ASSESSMENT — PATIENT HEALTH QUESTIONNAIRE - PHQ9: SUM OF ALL RESPONSES TO PHQ QUESTIONS 1-9: 12

## 2017-01-14 NOTE — PROGRESS NOTES
Quick Note:    Dear Ramesh    Your test results are attached. I am happy to let you know that they are stable.    The blood sugar is normal and you do not have diabetes. The kidneys are healthy. The hemoglobin is back to normal.     The cholesterol is good and your risk of heart disease in the next 10 years is low. You do not need any cholesterol medication at this time. Continue to eat a healthy diet rich in fruits and vegetables with low saturated fats.     Please contact me by Delivery Clubhart if you have any questions about your labs or management.    Linette Vergara MD    [place] 10-year CHD Risk Score: 4% (8 Total Points)   Values used to calculate score:   Age: 41 years -- Points: 0   Total Cholesterol: 226 mg/dL -- Points: 5   HDL Cholesterol: 43 mg/dL -- Points: 1   Systolic BP (untreated): 100 mmHg -- Points: 0   The patient is not a smoker. -- Points: 0   The patient has not been diagnosed with diabetes. -- Points: 0   The patient has a family history of CHD. -- Points: 2     ______

## 2017-01-16 ENCOUNTER — OFFICE VISIT (OUTPATIENT)
Dept: ORTHOPEDICS | Facility: CLINIC | Age: 42
End: 2017-01-16

## 2017-01-16 VITALS — DIASTOLIC BLOOD PRESSURE: 80 MMHG | SYSTOLIC BLOOD PRESSURE: 120 MMHG

## 2017-01-16 DIAGNOSIS — S62.323D CLOSED DISPLACED FRACTURE OF SHAFT OF THIRD METACARPAL BONE OF LEFT HAND WITH ROUTINE HEALING, SUBSEQUENT ENCOUNTER: Primary | ICD-10-CM

## 2017-01-16 PROCEDURE — 99207 ZZC FRACTURE CARE IN GLOBAL PERIOD: CPT | Performed by: ORTHOPAEDIC SURGERY

## 2017-01-16 ASSESSMENT — PAIN SCALES - GENERAL: PAINLEVEL: MILD PAIN (2)

## 2017-01-16 NOTE — NURSING NOTE
Ramesh Galvez's goals for this visit include: Revaluate metacarpal fracture.  He requests these members of his care team be copied on today's visit information: yes    PCP: Linette Vergara    Referring Provider:  No referring provider defined for this encounter.    Chief Complaint   Patient presents with     RECHECK     Left 3rd metacarpal fracture       Initial /80 mmHg Estimated body mass index is 32.27 kg/(m^2) as calculated from the following:    Height as of 1/13/17: 1.829 m (6').    Weight as of 1/13/17: 107.956 kg (238 lb).  BP completed using cuff size: regular

## 2017-01-16 NOTE — PATIENT INSTRUCTIONS
Thanks for coming today.  Ortho/Sports Medicine Clinic  [address] [place] [address]    To schedule future appointments in Ortho Clinic, you may call [phone].    To schedule ordered imaging by your provider:   Call Central Imaging [phone]    To schedule an injection ordered by your provider:  Call Central Imaging Injection scheduling line: [phone]  [employer] available online at:  [email or web address]    Please call if any further questions or concerns ([phone]).  Clinic hours 8 am to 5 pm.    Return to clinic (call) if symptoms worsen or fail to improve.

## 2017-01-16 NOTE — PROGRESS NOTES
[facility] , they can view our EPIC for all pre-op information.  Perry Bo,  For Teams [facility]

## 2017-01-16 NOTE — MR AVS SNAPSHOT
After Visit Summary   2017    Ramesh Galvez    MRN: [id number]           Patient Information     Date Of Birth          1975        Visit Information        Provider Department      2017 12:45 PM Jennifer Haywood MD [facility]        Today's Diagnoses     Closed displaced fracture of shaft of third metacarpal bone of left hand with routine healing, subsequent encounter    -  1      Care Instructions    Thanks for coming today.  Ortho/Sports Medicine Clinic  [address] [place] [address]    To schedule future appointments in Ortho Clinic, you may call [phone].    To schedule ordered imaging by your provider:   Call Central Imaging [phone]    To schedule an injection ordered by your provider:  Call Central Imaging Injection scheduling line: [phone]  [email or web address] available online at:  [email or web address]    Please call if any further questions or concerns ([phone]).  Clinic hours 8 am to 5 pm.    Return to clinic (call) if symptoms worsen or fail to improve.          Follow-ups after your visit        Who to contact     If you have questions or need follow up information about today's clinic visit or your schedule please contact [facility] directly at [phone].  Normal or non-critical lab and imaging results will be communicated to you by ContentRealtimehart, letter or phone within 4 business days after the clinic has received the results. If you do not hear from us within 7 days, please contact the clinic through [email or web address] or phone. If you have a critical or abnormal lab result, we will notify you by phone as soon as possible.  Submit refill requests through [email or web address] or call your pharmacy and they will forward the refill request to us. Please allow 3 business days for your refill to be completed.          Additional Information About Your Visit        [email or web address] Information     [email or web address] gives you secure access to  your electronic health record. If you see a primary care provider, you can also send messages to your care team and make appointments. If you have questions, please call your primary care clinic.  If you do not have a primary care provider, please call [phone] and they will assist you.      [employer] is an electronic gateway that provides easy, online access to your medical records. With [employer], you can request a clinic appointment, read your test results, renew a prescription or communicate with your care team.     To access your existing account, please contact your [facility] or call [phone] for assistance.        Care EveryWhere ID     This is your Care EveryWhere ID. This could be used by other organizations to access your [facility] medical records  [id number]         Blood Pressure from Last 3 Encounters:   01/16/17 120/80   01/13/17 100/78   01/02/17 118/84    Weight from Last 3 Encounters:   02/07/17 109.6 kg (241 lb 11.2 oz)   01/13/17 108 kg (238 lb)   01/02/17 107 kg (236 lb)              Today, you had the following     No orders found for display       Primary Care Provider Office Phone # Fax #    Linette Naima Vergara [phone]       [address] [facility] [address]        Thank you!     Thank you for choosing [facility]  for your care. Our goal is always to provide you with excellent care. Hearing back from our patients is one way we can continue to improve our services. Please take a few minutes to complete the written survey that you may receive in the mail after your visit with us. Thank you!             Your Updated Medication List - Protect others around you: Learn how to safely use, store and throw away your medicines at www.disposemymeds.org.          This list is accurate as of: 1/16/17 11:59 PM.  Always use your most recent med list.                   Brand Name Dispense Instructions  for use    amLODIPine 10 MG tablet    NORVASC    90 tablet    Take 1 tablet (10 mg) by mouth daily       cetirizine 10 MG tablet    zyrTEC     Take 10 mg by mouth daily       COENZYME Q-10 PO      Take 100 mg by mouth 2 times daily       DAILY MULTIPLE VITAMINS Tabs      Take by mouth daily       EPINEPHrine 0.3 MG/0.3ML injection    EPIPEN 2-SHERMAN    2 each    Inject 0.3 mLs into the muscle once as needed for anaphylaxis.       escitalopram 10 MG tablet    LEXAPRO    90 tablet    Take 1 tablet (10 mg) by mouth daily       hydrochlorothiazide 12.5 MG capsule    MICROZIDE    90 capsule    Take 1 capsule (12.5 mg) by mouth daily       MS CONTIN PO      Take by mouth every 12 hours       omeprazole 20 MG CR capsule    priLOSEC

## 2017-02-06 DIAGNOSIS — S62.309A CLOSED FRACTURE OF METACARPAL BONE: Primary | ICD-10-CM

## 2017-02-07 ENCOUNTER — OFFICE VISIT (OUTPATIENT)
Dept: ORTHOPEDICS | Facility: CLINIC | Age: 42
End: 2017-02-07

## 2017-02-07 VITALS — WEIGHT: 241.7 LBS | HEIGHT: 72 IN | BODY MASS INDEX: 32.74 KG/M2

## 2017-02-07 DIAGNOSIS — S62.323D CLOSED DISPLACED FRACTURE OF SHAFT OF THIRD METACARPAL BONE OF LEFT HAND WITH ROUTINE HEALING, SUBSEQUENT ENCOUNTER: Primary | ICD-10-CM

## 2017-02-07 ASSESSMENT — ENCOUNTER SYMPTOMS
JOINT SWELLING: 1
MUSCLE WEAKNESS: 1
ARTHRALGIAS: 1
BACK PAIN: 0
MYALGIAS: 1
MUSCLE CRAMPS: 0
STIFFNESS: 1
NECK PAIN: 1

## 2017-02-07 NOTE — PROGRESS NOTES
CHIEF COMPLAINT: Left third metacarpal fracture    DATE OF INJURY: 12/28/2016    HISTORY OF PRESENT ILLNESS:  Ramesh comes in today now 5 weeks out from a nonoperatively managed left third metacarpal diaphyseal fracture which was sustained on 12/28/2016.  Overall, the patient reports doing well.  He still has some mild aching in the hand, especially when he uses it and when he works on range of motion, but aside from that, he has not had any issues.  He has been working on range of motion on his own, not needing any therapy and has made excellent progress in this front.  His only other complaint today is noticing some clicking in the long finger range of motion.      PHYSICAL EXAMINATION:    QUICKDASH: 31.81  Pain is 1 out of 10.  Alert and oriented times three.  No acute distress.  Breathing is regular and non-labored.  Focused exam of the left hand reveals that he still has some mild residual swelling in the palm and dorsal hand.  He has minimal tenderness to palpation at the level of the fracture site in the long finger metacarpal.  He does have some palpable clicking at the level of metacarpal head on the long finger with range of motion consistent with a trigger finger.  His range of motion is excellent; he is able to create a full fist and he extends to just a couple degrees shy of full extension.   Skin intact.     IMAGING:  We have 3-views of the left hand available for review today which demonstrates good bony callus formation of the third metacarpal shaft fracture.  There is some mild shortening of the metacarpal.      ASSESSMENT & PLAN:  41-year-old gentleman who is right-hand dominant who sustained a left third metacarpal diaphyseal fracture on 12/28/2016, treated nonoperatively and doing well.  He has been able to recover his range of motion and radiographically the fracture site is healing nicely.      At this juncture, the patient can follow up p.r.n. with regard to the triggering, we discussed this  will likely resolve as the swelling in his hand continues to resolve.  If it does not and he continues to have issues in 6-12 weeks from now, he may certainly return to clinic for repeat evaluation and potentially a steroid injection for the trigger digit.  He has no restrictions at this juncture.      This patient was seen by Dr. Haywood who agrees with the above.     I have independently seen and evaluated the patient and agree with the findings and plan of care as documented by the resident and edited by me.        Answers for HPI/ROS submitted by the patient on 2/7/2017   General Symptoms: No  Skin Symptoms: No  HENT Symptoms: No  EYE SYMPTOMS: No  HEART SYMPTOMS: No  LUNG SYMPTOMS: No  INTESTINAL SYMPTOMS: No  URINARY SYMPTOMS: No  REPRODUCTIVE SYMPTOMS: No  SKELETAL SYMPTOMS: Yes  BLOOD SYMPTOMS: No  NERVOUS SYSTEM SYMPTOMS: No  MENTAL HEALTH SYMPTOMS: No  Back pain: No  Muscle aches: Yes  Neck pain: Yes  Swollen joints: Yes  Joint pain: Yes  Bone pain: Yes  Muscle cramps: No  Muscle weakness: Yes  Joint stiffness: Yes  Bone fracture: Yes

## 2017-02-07 NOTE — MR AVS SNAPSHOT
After Visit Summary   2/7/2017    Ramesh Galvez    MRN: [id number]           Patient Information     Date Of Birth          1975        Visit Information        Provider Department      2/7/2017 5:45 PM Jennifer Haywood MD [facility]        Today's Diagnoses     Closed displaced fracture of shaft of third metacarpal bone of left hand with routine healing, subsequent encounter    -  1       Follow-ups after your visit        Who to contact     Please call your clinic at [phone] to:    Ask questions about your health    Make or cancel appointments    Discuss your medicines    Learn about your test results    Speak to your doctor   If you have compliments or concerns about an experience at your clinic, or if you wish to file a complaint, please contact [facility] Patient Relations at [phone] or email us at [email or web address]         Additional Information About Your Visit        MyChart Information     trustedsafet gives you secure access to your electronic health record. If you see a primary care provider, you can also send messages to your care team and make appointments. If you have questions, please call your primary care clinic.  If you do not have a primary care provider, please call [phone] and they will assist you.      ClickToShop is an electronic gateway that provides easy, online access to your medical records. With [employer], you can request a clinic appointment, read your test results, renew a prescription or communicate with your care team.     To access your existing account, please contact your [facility] or call [phone] for assistance.        Care EveryWhere ID     This is your Care EveryWhere ID. This could be used by other organizations to access your [place] medical records  [id number]        Your Vitals Were     Height BMI (Body Mass Index)                1.824 m (5'  "11.81\") 32.95 kg/m2           Blood Pressure from Last 3 Encounters:   01/16/17 120/80   01/13/17 100/78   01/02/17 118/84    Weight from Last 3 Encounters:   02/07/17 109.6 kg (241 lb 11.2 oz)   01/13/17 108 kg (238 lb)   01/02/17 107 kg (236 lb)              Today, you had the following     No orders found for display       Primary Care Provider Office Phone # Fax #    Linette Naima Vergara [phone]       [facility] [address]  [facility] [address]        Thank you!     Thank you for choosing [facility]  for your care. Our goal is always to provide you with excellent care. Hearing back from our patients is one way we can continue to improve our services. Please take a few minutes to complete the written survey that you may receive in the mail after your visit with us. Thank you!             Your Updated Medication List - Protect others around you: Learn how to safely use, store and throw away your medicines at www.disposemymeds.org.          This list is accurate as of: 2/7/17 11:59 PM.  Always use your most recent med list.                   Brand Name Dispense Instructions for use    amLODIPine 10 MG tablet    NORVASC    90 tablet    Take 1 tablet (10 mg) by mouth daily       cetirizine 10 MG tablet    zyrTEC     Take 10 mg by mouth daily       COENZYME Q-10 PO      Take 100 mg by mouth 2 times daily       DAILY MULTIPLE VITAMINS Tabs      Take by mouth daily       EPINEPHrine 0.3 MG/0.3ML injection    EPIPEN 2-SHERMAN    2 each    Inject 0.3 mLs into the muscle once as needed for anaphylaxis.       escitalopram 10 MG tablet    LEXAPRO    90 tablet    Take 1 tablet (10 mg) by mouth daily       hydrochlorothiazide 12.5 MG capsule    MICROZIDE    90 capsule    Take 1 capsule (12.5 mg) by mouth daily       MS CONTIN PO      Take by mouth every 12 hours       omeprazole 20 MG CR capsule    priLOSEC         rizatriptan 10 MG tablet    MAXALT    18 tablet    TAKE 1 " TABLET BY MOUTH AT ONSET OF HEADACHE FOR MIGRAINE MAY REPEAT DOSE IN 2 HOURS.  DO NOT EXCEED 30 MG IN 24 HOURS

## 2017-02-07 NOTE — NURSING NOTE
"Reason For Visit:   Chief Complaint   Patient presents with     RECHECK     F/U for left 3rd metacarpal fracture. DOI 12/28/16       Primary MD: Linette Vergara  Ref. MD: Self     Age: 41 year old    ?  No      Ht 1.824 m (5' 11.81\")  Wt 109.634 kg (241 lb 11.2 oz)  BMI 32.95 kg/m2      Pain Assessment  Patient Currently in Pain: Yes  0-10 Pain Scale: 1  Primary Pain Location: Finger (Comment which one)  Pain Orientation: Left  Pain Descriptors: Discomfort  Alleviating Factors: Rest  Aggravating Factors: Movement    Hand Dominance Evaluation  Hand Dominance: Right          QuickDASH Assessment  QuickDASH Main 2/7/2017   1.Open a tight or new jar. Moderate difficulty   2. Do heavy household chores (e.g., wash walls, floors) Moderate difficulty   3. Carry a shopping bag or briefcase. Moderate difficulty   4. Wash your back. Mild difficulty   5. Use a knife to cut food. Mild difficulty   6. Recreational activities in which you take some force or impact through your arm, shoulder or hand (e.g., golf, hammering, tennis, etc.). Moderate difficulty   7. During the past week, to what extent has your arm, shoulder or hand problem interfered with your normal social activities with family, friends, neighbours or groups? Not at all   8. During the past week, were you limited in your work or other regular daily activities as a result of your arm, shoulder or hand problem? Slightly limited   9. Arm, shoulder or hand pain. Moderate   10.Tingling (pins and needles) in your arm,shoulder or hand. None   11. During the past week, how much difficulty have you had sleeping because of the pain in your arm, shoulder or hand? ([place] number) Mild difficulty   Quickdash Ability Score 31.81   1. Open a tight or new jar. -   2. Do heavy household chores (e.g., wash walls, floors). -   3. Carry a shopping bag or briefcase. -   4. Wash your back. -   5. Use a knife to cut food. -   6. Recreational activities in which you take " some force or impact through your arm, shoulder or hand (e.g., golf, hammering, tennis, etc.). -   7. During the past week, to what extent has your arm, shoulder or hand problem interfered with your normal social activities with family, friends, neighbours or groups? -   8. During the past week, were you limited in your work or other regular daily activities as a result of your arm, shoulder or hand problem? -   9. Arm, shoulder or hand pain. -   10. Tingling (pins and needles) in your arm,shoulder or hand. -   11. During the past week, how much difficulty have you had sleeping because of the pain in your arm, shoulder or hand? ([place] number) -   SUM: -          Current Outpatient Prescriptions   Medication Sig Dispense Refill     Morphine Sulfate (MS CONTIN PO) Take by mouth every 12 hours       omeprazole (PRILOSEC) 20 MG capsule   3     amLODIPine (NORVASC) 10 MG tablet Take 1 tablet (10 mg) by mouth daily 90 tablet 3     hydrochlorothiazide (MICROZIDE) 12.5 MG capsule Take 1 capsule (12.5 mg) by mouth daily 90 capsule 3     escitalopram (LEXAPRO) 10 MG tablet Take 1 tablet (10 mg) by mouth daily 90 tablet 3     rizatriptan (MAXALT) 10 MG tablet TAKE 1 TABLET BY MOUTH AT ONSET OF HEADACHE FOR MIGRAINE MAY REPEAT DOSE IN 2 HOURS.  DO NOT EXCEED 30 MG IN 24 HOURS 18 tablet 6     DAILY MULTIPLE VITAMINS TABS Take by mouth daily       cetirizine (ZYRTEC) 10 MG tablet Take 10 mg by mouth daily       COENZYME Q-10 PO Take 100 mg by mouth 2 times daily       EPINEPHrine (EPIPEN 2-SHERMAN) 0.3 MG/0.3ML injection Inject 0.3 mLs into the muscle once as needed for anaphylaxis. 2 each 1       Allergies   Allergen Reactions     Toradol Unknown     Wasps [Hornets] Hives, Itching and Swelling     Demerol Nausea and Vomiting     Sporanox [Imidazole Antifungals] Hives

## 2017-03-21 ENCOUNTER — MYC MEDICAL ADVICE (OUTPATIENT)
Dept: FAMILY MEDICINE | Facility: CLINIC | Age: 42
End: 2017-03-21

## 2017-03-21 DIAGNOSIS — G43.709 CHRONIC MIGRAINE WITHOUT AURA WITHOUT STATUS MIGRAINOSUS, NOT INTRACTABLE: ICD-10-CM

## 2017-03-21 RX ORDER — RIZATRIPTAN BENZOATE 10 MG/1
TABLET ORAL
Qty: 18 TABLET | Refills: 0 | Status: SHIPPED | OUTPATIENT
Start: 2017-03-21 | End: 2017-03-21

## 2017-03-21 RX ORDER — RIZATRIPTAN BENZOATE 10 MG/1
TABLET ORAL
Qty: 18 TABLET | Refills: 0 | Status: SHIPPED | OUTPATIENT
Start: 2017-03-21 | End: 2017-03-31

## 2017-03-21 NOTE — TELEPHONE ENCOUNTER
Mychart message sent to patient informing that this message will be review once provider is back in the office.  [facility] refill of Maxalt was provided per [facility] protocol.  Routing to provider to review upon return to clinic.  Gabrielle Kaur RN

## 2017-03-31 RX ORDER — RIZATRIPTAN BENZOATE 10 MG/1
TABLET ORAL
Qty: 18 TABLET | Refills: 3 | Status: SHIPPED | OUTPATIENT
Start: 2017-03-31 | End: 2017-11-24

## 2017-04-02 NOTE — PROGRESS NOTES
CHIEF COMPLAINT:  Left hand fracture.      DATE OF INJURY:  12/28/2016      HISTORY OF PRESENT ILLNESS:  Mr. Galvez is a 41-year-old, right-hand-dominant male who presents today for further evaluation and treatment of a left hand injury.  The patient reports that on 12/28, he was carrying furniture downstairs when he slipped on the stairs.  His left long finger directly impacted the steps.  He had immediate pain.  He was seen in urgent care that day, and a fracture was identified on radiographs.  He was placed into a splint.  He has taken some leftover pain medications that he had from a spine surgery to help with the discomfort.  He denies any head trauma or loss of consciousness.  He had no numbness or tingling.  No other injuries are appreciated.      REVIEW OF SYSTEMS:  A 14 point review of systems was obtained today on the intake form and is notable for high blood pressure, heartburn and migraines.  The remainder of the review of systems is unremarkable.      PAST MEDICAL HISTORY:   1.  Migraines.   2.  Hypertension.   3.  Allergic rhinitis.      PAST SURGICAL HISTORY:   1.  Hernia repair.   2.  Right open scaphoid fracture fixation in 08/1995.   3.  Sinus surgery complicated by bleeding.   4.  Right anterior cervical diskectomy and fusion on 03/22/2016 by Dr. Fidencio Santacruz.      MEDICATIONS:   1.  Prilosec.   2.  Norvasc.   3.  Hydrochlorothiazide.   4.  Lexapro.   5.  Multivitamin.   6.  Zyrtec.        ALLERGIES:   1.  Toradol.   2.  Wasps.   3.  Demerol.   4.  Sporanox.        SOCIAL HISTORY:  The patient lives with his fiancee.  He does [employer] for [employer].  He describes that as light duty.  He denies any tobacco use.  He drinks 1-2 alcoholic drinks 3-5 days a week.      FAMILY HISTORY:  Negative for bleeding or clotting disorders or adverse reactions to anesthesia.      PHYSICAL EXAMINATION:     VITAL SIGNS:  The patient is 72 inches tall and weighs 236 pounds.  Blood pressure is  118/84, and pulse is 96.  He rates his pain as a 0/10 on the visual analog scale.  His QuickDASH is 61.36.   GENERAL:  The patient is a healthy-appearing adult male in no acute distress.  He is alert and oriented x3.   HEENT:  His head is normocephalic and atraumatic.  His extraocular movements are intact.   NECK:  Full range of motion without pain.   RESPIRATORY:  Breathing is regular and nonlabored.   EXTREMITIES:  Examination of his left upper extremity reveals full shoulder, elbow and forearm range of motion.  He has decreased wrist range of motion secondary to immobilization.  The hand is significantly swollen with ecchymosis present.  The fingers are quite swollen, and he is unable to make a full fist.  He does have intact finger abduction, EPL and FPL.  His sensation is intact to light touch in all dermatomes, and the fingers are warm and well perfused with capillary refill less than 2 seconds.  His skin is intact without any open wounds, rashes or abrasions.      RADIOGRAPHIC IMAGING:  Three views of the left hand from the date of injury at the outside facility were available for review.  These demonstrate a short oblique fracture of the left long finger metacarpal, which is extraarticular.  There is minimal displacement.  No other fractures are identified.      Three views of the left hand were obtained today and independently reviewed.  These demonstrate no significant interval change.  The length of the metacarpal is maintained.  There is approximately a 5 mm translation of the distal fragment and no significant displacement on the lateral view.      ASSESSMENT:  The patient is a 41-year-old, right-hand-dominant male now 5 days status post left long finger metacarpal fracture, which is minimally displaced.      PLAN:  I think that this will likely do well without any surgical intervention.  However, he is quite swollen today and unable to make a full fist, so it is difficult to assess rotation.  I do  think that as the swelling comes down and he is able to make a full composite fist that there will not be any issues with malrotation, especially in the setting of an isolated metacarpal fracture.  The patient is quite concerned and has many appropriate questions.  I have answered these to the best of my ability.  I suggested that we allow for the swelling to come down and that we reevaluate in about 1 week.  In 1 week if there was significant malrotation, the fracture could still be reduced and fixed if needed.  He is comfortable with this plan.  In the interim, we will have him see hand therapy for fabrication of a protective thermoplastic splint.  All of his questions were answered.

## 2017-04-02 NOTE — PROGRESS NOTES
CHIEF COMPLAINT:  Followup left long finger metacarpal fracture.      DATE OF INJURY:  12/28/2015.      HISTORY OF PRESENT ILLNESS:  Mr. Galvez is a 41-year-old right-hand dominant male who is now approximately 2-1/2 weeks out from a left long finger metacarpal fracture which occurred when he fell while carrying furniture down the stairs.  I first saw him on 01/02 and at that time recommended nonoperative management.  He saw OT and had a splint fabricated and began range of motion exercises.  There was some concern last week that things may have changed, and thus, x-rays were obtained in my absence.  He is here today for followup and repeat examination.  Overall, he is doing fairly well.  He is having minimal pain.  His swelling has subsided.  He is not noticing any significant rotational deformities.      PHYSICAL EXAMINATION:     VITAL SIGNS:  Pain is a 2/10 on the visual analog scale.  Blood pressure is 120/80.  QuickDASH is 59.09.   GENERAL:  The patient is a healthy-appearing adult male in no acute distress.  He is alert and oriented x3.   RESPIRATORY:  Breathing is regular and nonlabored.   EXTREMITIES:  Examination of his left upper extremity reveals full shoulder, elbow, forearm, and wrist range of motion.  Close evaluation of the left hand reveals the swelling has decreased significantly, especially in the fingers.  He is able to make nearly a full fist, and there are no rotational abnormalities noted.  No scissoring.  He has mild tenderness along the metacarpal at the long finger.  His neurovascular exam is otherwise intact.  Skin is intact.      RADIOGRAPHIC IMAGING:  Three views of the left hand from 01/13/2017 were available for review.  These were compared to previous imaging from 01/02.  There is perhaps some mild shortening of the butterfly fragment and perhaps some increased translation by 2-3 mm.  Overall, alignment is well maintained.      ASSESSMENT:  The patient is a 41-year-old right-hand  dominant male now 2-1/2 weeks out from a left long finger metacarpal shaft fracture with mild displacement.  No rotational abnormalities.      PLAN:  I again discussed treatment options with Mr. Galvez.  There is some mild shortening in the functional deficit that could be a mild extensor lag, but I am not seeing that on exam today.  I do think he will do quite well once this heals and have no functional limitations or deficits.  He has no rotational abnormalities that would necessitate surgical intervention.  I would recommend that we stay the course with protection and working on range of motion.  I will see him again in approximately 3-4 weeks for reevaluation with new x-rays of the left hand at that time.  He is doing quite well in regards to his motion and I am not sure that he needs any significant therapy other than continuing his exercises on his own.  He is comfortable with this plan.

## 2017-05-19 DIAGNOSIS — I10 ESSENTIAL HYPERTENSION WITH GOAL BLOOD PRESSURE LESS THAN 140/90: ICD-10-CM

## 2017-05-19 DIAGNOSIS — F33.1 MAJOR DEPRESSIVE DISORDER, RECURRENT EPISODE, MODERATE (H): ICD-10-CM

## 2017-05-23 RX ORDER — ESCITALOPRAM OXALATE 10 MG/1
TABLET ORAL
Qty: 90 TABLET | Refills: 1 | OUTPATIENT
Start: 2017-05-23

## 2017-05-23 RX ORDER — AMLODIPINE BESYLATE 10 MG/1
TABLET ORAL
Qty: 90 TABLET | Refills: 1 | OUTPATIENT
Start: 2017-05-23

## 2017-05-23 RX ORDER — HYDROCHLOROTHIAZIDE 12.5 MG/1
CAPSULE ORAL
Qty: 90 CAPSULE | Refills: 1 | OUTPATIENT
Start: 2017-05-23

## 2017-05-23 NOTE — TELEPHONE ENCOUNTER
The following prescriptions were sent to [facility]:    amLODIPine (NORVASC) 10 MG tablet 90 tablet 3 4/5/2017  No   Sig: Take 1 tablet (10 mg) by mouth daily   Class: E-Prescribe   Route: Oral   Order: [id number]   E-Prescribing Status: Receipt confirmed by pharmacy (4/5/2017 11:44 AM CDT)     escitalopram (LEXAPRO) 10 MG tablet 90 tablet 3 4/5/2017  No   Sig: Take 1 tablet (10 mg) by mouth daily   Class: E-Prescribe   Route: Oral   Order: [id number]   E-Prescribing Status: Receipt confirmed by pharmacy (4/5/2017 11:44 AM CDT)     hydrochlorothiazide (MICROZIDE) 12.5 MG capsule 90 capsule 3 4/5/2017  No   Sig: Take 1 capsule (12.5 mg) by mouth daily   Class: E-Prescribe   Route: Oral   Order: [id number]   E-Prescribing Status: Receipt confirmed by pharmacy (4/5/2017 11:44 AM CDT)     Request denied.  Too soon to fill.    Becky Malik RN

## 2017-05-25 DIAGNOSIS — G43.709 CHRONIC MIGRAINE WITHOUT AURA WITHOUT STATUS MIGRAINOSUS, NOT INTRACTABLE: ICD-10-CM

## 2017-05-25 NOTE — TELEPHONE ENCOUNTER
rizatriptan (MAXALT) 10 MG tablet      Last Written Prescription Date: 3/31/17  Last Fill Quantity: 18, # refills: 3  Last Office Visit with [facility], [facility] or [facility] prescribing provider: 1/13/17       BP Readings from Last 3 Encounters:   01/16/17 120/80   01/13/17 100/78   01/02/17 118/84         [facility]

## 2017-05-30 RX ORDER — RIZATRIPTAN BENZOATE 10 MG/1
TABLET ORAL
Qty: 18 TABLET | Refills: 0 | Status: SHIPPED | OUTPATIENT
Start: 2017-05-30 | End: 2017-08-23

## 2017-05-30 NOTE — TELEPHONE ENCOUNTER
Routing refill request to provider for review/approval because:  Frequency of use.  Gabrielle Kaur RN

## 2017-06-15 ENCOUNTER — TELEPHONE (OUTPATIENT)
Dept: FAMILY MEDICINE | Facility: CLINIC | Age: 42
End: 2017-06-15

## 2017-06-15 NOTE — TELEPHONE ENCOUNTER
Panel Management Review      BP Readings from Last 1 Encounters:   01/16/17 120/80    , No results found for: A1C, 1/13/2017    Fail List measure:     Depression / Dysthymia review  PHQ-9 SCORE 5/16/2016 7/13/2016 1/13/2017   Total Score - - -   Total Score 0 10 12      Patient is due for:  PHQ9      Patient is due/failing the following:   PHQ9    Action needed:   Patient needs to do PHQ9.    Type of outreach:    Sent letter. Patient has not responded to [employer] message sent last month.    Questions for provider review:    None                                                                                                                                    Danny Diaz      Chart routed to Care Team .

## 2017-06-21 NOTE — TELEPHONE ENCOUNTER
This writer attempted to contact Ramesh on 06/21/17    Reason for call update PHQ9 and left message to return call.    When patient calls back, please contact 1st floor Carole Snow. routine priority.        Danny Diaz

## 2017-07-06 NOTE — TELEPHONE ENCOUNTER
This writer attempted to contact Ramesh on 07/06/17  Reason for call update PHQ9 and left message to return call  When patient calls back, please contact 1st floor Carole Snow. routine priority.        Danny Diaz

## 2017-08-23 DIAGNOSIS — G43.709 CHRONIC MIGRAINE WITHOUT AURA WITHOUT STATUS MIGRAINOSUS, NOT INTRACTABLE: ICD-10-CM

## 2017-08-23 RX ORDER — RIZATRIPTAN BENZOATE 10 MG/1
TABLET ORAL
Qty: 18 TABLET | Refills: 0 | Status: SHIPPED | OUTPATIENT
Start: 2017-08-23 | End: 2017-11-06

## 2017-08-23 NOTE — TELEPHONE ENCOUNTER
rizatriptan (MAXALT) 10 MG tablet      Last Written Prescription Date: 05/30/17  Last Fill Quantity: 18, # refills: 0  Last Office Visit with [facility], [facility] or [facility] prescribing provider: 01/13/17       BP Readings from Last 3 Encounters:   01/16/17 120/80   01/13/17 100/78   01/02/17 118/84         [facility]

## 2017-11-06 ENCOUNTER — OFFICE VISIT (OUTPATIENT)
Dept: FAMILY MEDICINE | Facility: CLINIC | Age: 42
End: 2017-11-06
Payer: COMMERCIAL

## 2017-11-06 VITALS
SYSTOLIC BLOOD PRESSURE: 114 MMHG | TEMPERATURE: 98.2 F | OXYGEN SATURATION: 96 % | BODY MASS INDEX: 33.13 KG/M2 | DIASTOLIC BLOOD PRESSURE: 86 MMHG | HEART RATE: 94 BPM | WEIGHT: 243 LBS | RESPIRATION RATE: 12 BRPM

## 2017-11-06 DIAGNOSIS — I10 HYPERTENSION GOAL BP (BLOOD PRESSURE) < 140/90: ICD-10-CM

## 2017-11-06 DIAGNOSIS — J01.01 ACUTE RECURRENT MAXILLARY SINUSITIS: Primary | ICD-10-CM

## 2017-11-06 PROCEDURE — 99213 OFFICE O/P EST LOW 20 MIN: CPT | Performed by: FAMILY MEDICINE

## 2017-11-06 NOTE — NURSING NOTE
"Chief Complaint   Patient presents with     Sinus Problem     possible sinus infection X 1 week       Initial /86 (BP Location: Right arm, Patient Position: Chair, Cuff Size: Adult Large)  Pulse 94  Temp 98.2  F (36.8  C) (Oral)  Resp 12  Wt 243 lb (110.2 kg)  SpO2 96%  BMI 33.13 kg/m2 Estimated body mass index is 33.13 kg/(m^2) as calculated from the following:    Height as of 2/7/17: 5' 11.81\" (1.824 m).    Weight as of this encounter: 243 lb (110.2 kg).  Medication Reconciliation: complete   Nate Roland MA      "

## 2017-11-06 NOTE — PROGRESS NOTES
SUBJECTIVE:   Ramesh Galvez is a 42 year old male who presents to clinic today for the following health issues:    He has been having allergic issues with sinuses for months in the house he is in.   About a week ago he began to have more issues with sinus headache and pressure and pain and sore throat.   He started sinuses rinses back then to try and get rid of it himself.   He is using 12 hour sudafed.   He is not having high fever.        Acute Illness   Acute illness concerns: Possible Sinus Infection   Onset: X 1 week     Fever: no     Chills/Sweats: no     Headache (location?): YES    Sinus Pressure:YES    Conjunctivitis:  no    Ear Pain: YES: left    Rhinorrhea: no     Congestion: YES    Sore Throat: YES     Cough: YES - constant need to clear throat, post nasal drip     Wheeze: no     Decreased Appetite: YES    Nausea: YES    Vomiting: no     Diarrhea:  YES- but doesn't think it is related     Dysuria/Freq.: no     Fatigue/Achiness: YES    Sick/Strep Exposure: no      Therapies Tried and outcome: Nasal rinse BID, Sudafed.  These helped temporarily, relieves some pressure, easier to sleep       Past Medical History:   Diagnosis Date     ALLERGIC RHINITIS NOS 1/18/2007     CHR MAXILLARY SINUSITIS 1/18/2007    Chronic Recurrent     DERMATOPHYTOSIS OF BODY 9/20/2007    Chronic recurrent tinea versicolor     HTN (hypertension) 7/29/2010     Migraines      Other chronic pain     neck, between shoulder blades more on right side, right arm       Past Surgical History:   Procedure Laterality Date     C ARTHROPLASTY WRIST JT      R wrist graft from hip secondary to traumatic fracture     DISCECTOMY, FUSION CERVICAL ANTERIOR ONE LEVEL, COMBINED Right 3/22/2016    Procedure: COMBINED DISCECTOMY, FUSION CERVICAL ANTERIOR ONE LEVEL;  Surgeon: Fidencio Santacruz MD;  Location: [facility] OR     HC NASAL/SINUS SCOPE W THER INSTILL       HC OPEN TX CARPAL SCAPHOID NAVICULAR FRACTURE Right      HERNIA REPAIR, INGUINAL RT/LT   Age 9 years    Right     ORTHOPEDIC SURGERY      right femur surgery       MEDICATIONS:  Current Outpatient Prescriptions   Medication     SUMAtriptan (IMITREX) 100 MG tablet     amLODIPine (NORVASC) 10 MG tablet     hydrochlorothiazide (MICROZIDE) 12.5 MG capsule     escitalopram (LEXAPRO) 10 MG tablet     tiZANidine (ZANAFLEX) 4 MG tablet     omeprazole (PRILOSEC) 20 MG capsule     DAILY MULTIPLE VITAMINS TABS     cetirizine (ZYRTEC) 10 MG tablet     COENZYME Q-10 PO     EPINEPHrine (EPIPEN 2-SHERMAN) 0.3 MG/0.3ML injection     rizatriptan (MAXALT) 10 MG tablet     No current facility-administered medications for this visit.      Facility-Administered Medications Ordered in Other Visits   Medication     dexamethasone (DECADRON) injection 10 mg     iopamidol (ISOVUE-M 200) solution 20 mL     lidocaine (PF) (XYLOCAINE) 1 % injection 300 mg       SOCIAL HISTORY:  Social History   Substance Use Topics     Smoking status: Never Smoker     Smokeless tobacco: Never Used     Alcohol use 0.0 oz/week     0 Standard drinks or equivalent per week      Comment: social etoh       Family History   Problem Relation Age of Onset     Breast Cancer Mother      C.A.D. Father      Hypertension Father      HEART DISEASE Father      DIABETES Maternal Grandfather      Asthma No family hx of      CEREBROVASCULAR DISEASE No family hx of      Cancer - colorectal No family hx of      Prostate Cancer No family hx of        Objective:  Blood pressure 114/86, pulse 94, temperature 98.2  F (36.8  C), temperature source Oral, resp. rate 12, weight 243 lb (110.2 kg), SpO2 96 %.  HEENT:  TM's are clear bilaterally.  Oropharynx is clear without tonsillar hypertrophy or exudate.  Conjuctiva are clear.  Nose: moderate to severe edema and redness - right side with some purulence but left side is tender on the maxillary sinus  Neck:  There is no lymphadenopathy or thyroid tenderness or enlargement  Chest: Clear to auscultation bilaterally.  No wheezes,  rales or retractions.  CV: Regular rate and rhythm without murmurs, rubs or gallops.    Assessment:  1. Maxillary sinusitis - acute - first time in almost 10 years  2. blood pressure - under good control     Plan:  1. augmentin  2. The potential side effects of the prescription medication were discussed with the patient.  3. The patient is to follow up as needed for nonresolution of symptoms

## 2017-11-06 NOTE — MR AVS SNAPSHOT
After Visit Summary   11/6/2017    Ramesh Galvez    MRN: [id number]           Patient Information     Date Of Birth          1975        Visit Information        Provider Department      11/6/2017 11:15 AM Nhung Chapa MD [facility]        Today's Diagnoses     Acute recurrent maxillary sinusitis    -  1       Follow-ups after your visit        Who to contact     If you have questions or need follow up information about today's clinic visit or your schedule please contact [facility] directly at [phone].  Normal or non-critical lab and imaging results will be communicated to you by MyChart, letter or phone within 4 business days after the clinic has received the results. If you do not hear from us within 7 days, please contact the clinic through TradeBeamhart or phone. If you have a critical or abnormal lab result, we will notify you by phone as soon as possible.  Submit refill requests through Zenter or call your pharmacy and they will forward the refill request to us. Please allow 3 business days for your refill to be completed.          Additional Information About Your Visit        MyChart Information     Zenter gives you secure access to your electronic health record. If you see a primary care provider, you can also send messages to your care team and make appointments. If you have questions, please call your primary care clinic.  If you do not have a primary care provider, please call [phone] and they will assist you.        Care EveryWhere ID     This is your Care EveryWhere ID. This could be used by other organizations to access your [place] medical records  [id number]        Your Vitals Were     Pulse Temperature Respirations Pulse Oximetry BMI (Body Mass Index)       94 98.2  F (36.8  C) (Oral) 12 96% 33.13 kg/m2        Blood Pressure from Last 3 Encounters:   11/06/17 114/86   01/16/17 120/80   01/13/17 100/78    Weight from Last 3  Encounters:   11/06/17 243 lb (110.2 kg)   02/07/17 241 lb 11.2 oz (109.6 kg)   01/13/17 238 lb (108 kg)              Today, you had the following     No orders found for display         Today's Medication Changes          These changes are accurate as of: 11/6/17 11:22 AM.  If you have any questions, ask your nurse or doctor.               Start taking these medicines.        Dose/Directions    amoxicillin-clavulanate 875-125 MG per tablet   Commonly known as:  AUGMENTIN   Used for:  Acute recurrent maxillary sinusitis   Started by:  Nhung Chapa MD        Dose:  1 tablet   Take 1 tablet by mouth 2 times daily   Quantity:  20 tablet   Refills:  0            Where to get your medicines      These medications were sent to [facility] [address]  [address] [facility] [address]     Phone:  [phone]     amoxicillin-clavulanate 875-125 MG per tablet                Primary Care Provider Office Phone # Fax #    Linette Naima Vergara [phone] [address] [facility] [address]        Equal Access to Services     [facility]: Hadii aad ku hadasho Soomaali, waaxda luqadaha, qaybta kaalmada adeegyada, magalis wang . So [facility] [phone].    ATENCIÓN: Si habla español, tiene a de la rosa disposición servicios gratuitos de asistencia lingüística. Llame al [phone].    We comply with applicable federal civil rights laws and [place] laws. We do not discriminate on the basis of race, color, national origin, age, disability, sex, sexual orientation, or gender identity.            Thank you!     Thank you for choosing [facility]  for your care. Our goal is always to provide you with excellent care. Hearing back from our patients is one way we can continue to improve our services. Please take a few minutes to complete the written survey that you may receive in the mail after your visit with us. Thank  you!             Your Updated Medication List - Protect others around you: Learn how to safely use, store and throw away your medicines at www.disposemymeds.org.          This list is accurate as of: 11/6/17 11:22 AM.  Always use your most recent med list.                   Brand Name Dispense Instructions for use Diagnosis    amLODIPine 10 MG tablet    NORVASC    90 tablet    Take 1 tablet (10 mg) by mouth daily    Essential hypertension with goal blood pressure less than 140/90       amoxicillin-clavulanate 875-125 MG per tablet    AUGMENTIN    20 tablet    Take 1 tablet by mouth 2 times daily    Acute recurrent maxillary sinusitis       cetirizine 10 MG tablet    zyrTEC     Take 10 mg by mouth daily        COENZYME Q-10 PO      Take 100 mg by mouth 2 times daily        DAILY MULTIPLE VITAMINS Tabs      Take by mouth daily        EPINEPHrine 0.3 MG/0.3ML injection    EPIPEN 2-SHERMAN    2 each    Inject 0.3 mLs into the muscle once as needed for anaphylaxis.    Toxic effect of venom(989.5)       escitalopram 10 MG tablet    LEXAPRO    90 tablet    Take 1 tablet (10 mg) by mouth daily    Major depressive disorder, recurrent episode, moderate (H)       hydrochlorothiazide 12.5 MG capsule    MICROZIDE    90 capsule    Take 1 capsule (12.5 mg) by mouth daily    Essential hypertension with goal blood pressure less than 140/90       omeprazole 20 MG CR capsule    priLOSEC          rizatriptan 10 MG tablet    MAXALT    18 tablet    TAKE 1 TABLET BY MOUTH AT ONSET OF HEADACHE FOR MIGRAINE MAY REPEAT DOSE IN 2 HOURS.  DO NOT EXCEED 30 MG IN 24 HOURS    Chronic migraine without aura without status migrainosus, not intractable       SUMAtriptan 100 MG tablet    IMITREX    18 tablet    Take 1 tablet (100 mg) by mouth at onset of headache for migraine May repeat in 2 hours. Max 2 tablets/24 hours.    Intractable chronic migraine without aura and without status migrainosus       tiZANidine 4 MG tablet    ZANAFLEX    60 tablet     Take 1-2 tablets (4-8 mg) by mouth 3 times daily as needed for muscle spasms    Cervical radiculopathy at C7, Cervical stenosis of spine

## 2017-11-24 ENCOUNTER — OFFICE VISIT (OUTPATIENT)
Dept: FAMILY MEDICINE | Facility: CLINIC | Age: 42
End: 2017-11-24
Payer: COMMERCIAL

## 2017-11-24 VITALS
OXYGEN SATURATION: 93 % | TEMPERATURE: 97.2 F | DIASTOLIC BLOOD PRESSURE: 81 MMHG | HEART RATE: 107 BPM | HEIGHT: 72 IN | BODY MASS INDEX: 32.37 KG/M2 | WEIGHT: 239 LBS | SYSTOLIC BLOOD PRESSURE: 118 MMHG

## 2017-11-24 DIAGNOSIS — J01.10 ACUTE NON-RECURRENT FRONTAL SINUSITIS: Primary | ICD-10-CM

## 2017-11-24 DIAGNOSIS — G43.709 CHRONIC MIGRAINE WITHOUT AURA WITHOUT STATUS MIGRAINOSUS, NOT INTRACTABLE: ICD-10-CM

## 2017-11-24 DIAGNOSIS — Z28.21 VACCINATION NOT CARRIED OUT BECAUSE OF PATIENT REFUSAL: ICD-10-CM

## 2017-11-24 PROCEDURE — 99214 OFFICE O/P EST MOD 30 MIN: CPT | Performed by: FAMILY MEDICINE

## 2017-11-24 RX ORDER — DOXYCYCLINE 100 MG/1
100 CAPSULE ORAL 2 TIMES DAILY
Qty: 20 CAPSULE | Refills: 0 | Status: SHIPPED | OUTPATIENT
Start: 2017-11-24 | End: 2017-12-04

## 2017-11-24 RX ORDER — RIZATRIPTAN BENZOATE 10 MG/1
5-10 TABLET ORAL
Qty: 18 TABLET | Refills: 0 | Status: SHIPPED | OUTPATIENT
Start: 2017-11-24 | End: 2018-02-12

## 2017-11-24 ASSESSMENT — PAIN SCALES - GENERAL: PAINLEVEL: MILD PAIN (3)

## 2017-11-24 NOTE — NURSING NOTE
"Chief Complaint   Patient presents with     Sinus Problem     Medication follow up       Initial /81 (BP Location: Right arm, Patient Position: Chair, Cuff Size: Adult Large)  Pulse 107  Temp 97.2  F (36.2  C) (Oral)  Ht 5' 11.8\" (1.824 m)  Wt 239 lb (108.4 kg)  SpO2 93%  BMI 32.6 kg/m2 Estimated body mass index is 32.6 kg/(m^2) as calculated from the following:    Height as of this encounter: 5' 11.8\" (1.824 m).    Weight as of this encounter: 239 lb (108.4 kg).  Medication Reconciliation: complete     Danny Diaz CMA    "

## 2017-11-24 NOTE — MR AVS SNAPSHOT
After Visit Summary   11/24/2017    Ramesh Galvez    MRN: [id number]           Patient Information     Date Of Birth          1975        Visit Information        Provider Department      11/24/2017 3:20 PM Robson Matos MD [facility]        Today's Diagnoses     Acute non-recurrent frontal sinusitis    -  1    Chronic migraine without aura without status migrainosus, not intractable        Vaccination not carried out because of patient refusal          Care Instructions    At [facility], we strive to deliver an exceptional experience to you, every time we see you.  If you receive a survey in the mail, please send us back your thoughts. We really do value your feedback.    Based on your medical history, these are the current health maintenance/preventive care services that you are due for (some may have been done at this visit.)  Health Maintenance Due   Topic Date Due     PHQ-9 Q6 MONTHS  07/13/2017     INFLUENZA VACCINE (SYSTEM ASSIGNED)  09/01/2017     DEPRESSION ACTION PLAN Q1 YR  11/08/2017         Suggested websites for health information:  Www.[email or web address].org : Up to date and easily searchable information on multiple topics.  Www.medlineplus.gov : medication info, interactive tutorials, watch real surgeries online  Www.familydoctor.org : good info from the Academy of Family Physicians  Www.cdc.gov : public health info, travel advisories, epidemics (H1N1)  Www.aap.org : children's health info, normal development, vaccinations  Www.health.state.[place].us : [place] dept of health, public health issues in [place], N1N1    Your care team:                            Family Medicine Internal Medicine   MD Jordan Bonilla MD Shantel Branch-Fleming, MD Katya Georgiev PA-C Nam Ho, MD Pediatrics   ZINA Barry, DORA Bhardwaj APRN CNP   MD Jody Ny MD Deborah Mielke, MD Kim Thein, APRN CNP   "    Clinic hours: Monday - Thursday 7 am-7 pm; Fridays 7 am-5 pm.   Urgent care: Monday - Friday 11 am-9 pm; Saturday and Sunday 9 am-5 pm.  Pharmacy : Monday -Thursday 8 am-8 pm; Friday 8 am-6 pm; Saturday and Sunday 9 am-5 pm.     Clinic: [phone]   Pharmacy: [phone]            Follow-ups after your visit        Follow-up notes from your care team     Return in about 2 weeks (around 12/8/2017) for recheck if symptoms fail to resolve by then.      Who to contact     If you have questions or need follow up information about today's clinic visit or your schedule please contact [facility] directly at [phone].  Normal or non-critical lab and imaging results will be communicated to you by MyChart, letter or phone within 4 business days after the clinic has received the results. If you do not hear from us within 7 days, please contact the clinic through Baokuhart or phone. If you have a critical or abnormal lab result, we will notify you by phone as soon as possible.  Submit refill requests through BuzzDoes or call your pharmacy and they will forward the refill request to us. Please allow 3 business days for your refill to be completed.          Additional Information About Your Visit        MyChart Information     BuzzDoes gives you secure access to your electronic health record. If you see a primary care provider, you can also send messages to your care team and make appointments. If you have questions, please call your primary care clinic.  If you do not have a primary care provider, please call [phone] and they will assist you.        Care EveryWhere ID     This is your Care EveryWhere ID. This could be used by other organizations to access your [place] medical records  [id number]        Your Vitals Were     Pulse Temperature Height Pulse Oximetry BMI (Body Mass Index)       107 97.2  F (36.2  C) (Oral) 1.824 m (5' 11.8\") 93% 32.6 kg/m2        Blood Pressure from Last 3 " Encounters:   11/24/17 118/81   11/06/17 114/86   01/16/17 120/80    Weight from Last 3 Encounters:   11/24/17 108.4 kg (239 lb)   11/06/17 110.2 kg (243 lb)   02/07/17 109.6 kg (241 lb 11.2 oz)              Today, you had the following     No orders found for display         Today's Medication Changes          These changes are accurate as of: 11/24/17  4:00 PM.  If you have any questions, ask your nurse or doctor.               Start taking these medicines.        Dose/Directions    doxycycline monohydrate 100 MG capsule   Used for:  Acute non-recurrent frontal sinusitis   Started by:  Robson Matos MD        Dose:  100 mg   Take 1 capsule (100 mg) by mouth 2 times daily for 10 days for sinus infection.   Quantity:  20 capsule   Refills:  0         These medicines have changed or have updated prescriptions.        Dose/Directions    * rizatriptan 10 MG tablet   Commonly known as:  MAXALT   This may have changed:  Another medication with the same name was added. Make sure you understand how and when to take each.   Used for:  Chronic migraine without aura without status migrainosus, not intractable   Changed by:  Linette Vergara MD        TAKE 1 TABLET BY MOUTH AT ONSET OF HEADACHE FOR MIGRAINE MAY REPEAT DOSE IN 2 HOURS.  DO NOT EXCEED 30 MG IN 24 HOURS   Quantity:  18 tablet   Refills:  3       * rizatriptan 10 MG tablet   Commonly known as:  MAXALT   This may have changed:  You were already taking a medication with the same name, and this prescription was added. Make sure you understand how and when to take each.   Used for:  Chronic migraine without aura without status migrainosus, not intractable   Changed by:  Robson Matos MD        Dose:  5-10 mg   Take 0.5-1 tablets (5-10 mg) by mouth at onset of headache for migraine May repeat every 2 hours as needed, but max. 30mg (3 tab) per 24-hour period.   Quantity:  18 tablet   Refills:  0       * Notice:  This list has 2 medication(s) that are the  same as other medications prescribed for you. Read the directions carefully, and ask your doctor or other care provider to review them with you.         Where to get your medicines      These medications were sent to [id number] - [place], [place] - [address]  [address] [facility] [address]     Phone:  [phone]     doxycycline monohydrate 100 MG capsule         Some of these will need a paper prescription and others can be bought over the counter.  Ask your nurse if you have questions.     Bring a paper prescription for each of these medications     rizatriptan 10 MG tablet                Primary Care Provider Office Phone # Fax #    Linette Vergara [phone]       [address]  [place] [address]        Equal Access to Services     [facility] : Hadii ameya barber hadasho Soomaali, waaxda luqadaha, qaybta kaalmada adeegyada, magalis wang . So [facility] [phone].    ATENCIÓN: Si habla español, tiene a de la rosa disposición servicios gratuitos de asistencia lingüística. Llame al [phone].    We comply with applicable federal civil rights laws and [place] laws. We do not discriminate on the basis of race, color, national origin, age, disability, sex, sexual orientation, or gender identity.            Thank you!     Thank you for choosing [facility]  for your care. Our goal is always to provide you with excellent care. Hearing back from our patients is one way we can continue to improve our services. Please take a few minutes to complete the written survey that you may receive in the mail after your visit with us. Thank you!             Your Updated Medication List - Protect others around you: Learn how to safely use, store and throw away your medicines at www.disposemymeds.org.          This list is accurate as of: 11/24/17  4:00 PM.  Always use your most recent med list.                   Brand Name  Dispense Instructions for use Diagnosis    amLODIPine 10 MG tablet    NORVASC    90 tablet    Take 1 tablet (10 mg) by mouth daily    Essential hypertension with goal blood pressure less than 140/90       cetirizine 10 MG tablet    zyrTEC     Take 10 mg by mouth daily        COENZYME Q-10 PO      Take 100 mg by mouth 2 times daily        DAILY MULTIPLE VITAMINS Tabs      Take by mouth daily        doxycycline monohydrate 100 MG capsule     20 capsule    Take 1 capsule (100 mg) by mouth 2 times daily for 10 days for sinus infection.    Acute non-recurrent frontal sinusitis       EPINEPHrine 0.3 MG/0.3ML injection    EPIPEN 2-SHERMAN    2 each    Inject 0.3 mLs into the muscle once as needed for anaphylaxis.    Toxic effect of venom(989.5)       escitalopram 10 MG tablet    LEXAPRO    90 tablet    Take 1 tablet (10 mg) by mouth daily    Major depressive disorder, recurrent episode, moderate (H)       hydrochlorothiazide 12.5 MG capsule    MICROZIDE    90 capsule    Take 1 capsule (12.5 mg) by mouth daily    Essential hypertension with goal blood pressure less than 140/90       omeprazole 20 MG CR capsule    priLOSEC          * rizatriptan 10 MG tablet    MAXALT    18 tablet    TAKE 1 TABLET BY MOUTH AT ONSET OF HEADACHE FOR MIGRAINE MAY REPEAT DOSE IN 2 HOURS.  DO NOT EXCEED 30 MG IN 24 HOURS    Chronic migraine without aura without status migrainosus, not intractable       * rizatriptan 10 MG tablet    MAXALT    18 tablet    Take 0.5-1 tablets (5-10 mg) by mouth at onset of headache for migraine May repeat every 2 hours as needed, but max. 30mg (3 tab) per 24-hour period.    Chronic migraine without aura without status migrainosus, not intractable       SUMAtriptan 100 MG tablet    IMITREX    18 tablet    Take 1 tablet (100 mg) by mouth at onset of headache for migraine May repeat in 2 hours. Max 2 tablets/24 hours.    Intractable chronic migraine without aura and without status migrainosus       tiZANidine 4 MG tablet     ZANAFLEX    60 tablet    Take 1-2 tablets (4-8 mg) by mouth 3 times daily as needed for muscle spasms    Cervical radiculopathy at C7, Cervical stenosis of spine       * Notice:  This list has 2 medication(s) that are the same as other medications prescribed for you. Read the directions carefully, and ask your doctor or other care provider to review them with you.

## 2017-11-24 NOTE — PROGRESS NOTES
"  SUBJECTIVE:   Ramesh Galvez is a 42 year old male who presents to clinic today for the following health issues:    Acute Illness   Acute illness concerns: Sinus infection medication follow up               Onset: ~10/31/17, was seen 11/6/17 and treated for sinus infection, symptoms decreased slightly with use of Augmentin but returned before he finished the course of antibiotics    Fever: no     Chills/Sweats: no     Headache (location?): YES - sinus headaches trigger migraines (has taken 10-12 Imitrex since this illness started). He also has taken Maxalt for this issue (never at the same time as Imitrex), and is requesting a script for Maxalt.    Sinus Pressure:YES    Conjunctivitis:  no    Ear Pain: YES: left    Rhinorrhea: no     Congestion: YES    Sore Throat: No   Cough: YES - constant need to clear throat, post nasal drip     Wheeze: no     Decreased Appetite: YES    Nausea: YES    Vomiting: no     Diarrhea:  YES- but doesn't think it is related     Dysuria/Freq.: no     Fatigue/Achiness: YES    Sick/Strep Exposure: no    Therapies Tried and outcome: Nasal rinse BID, Sudafed.  These helped temporarily, relieves some pressure, easier to sleep     Medications updated and reviewed.  Past, family and surgical history is updated and reviewed in the record.    ROS:  Other than noted above, general, HEENT, respiratory, cardiac and gastrointestinal systems are negative.    This document serves as a record of the services and decisions personally performed and made by Dr. Matos. It was created on his behalf by Lissy Sweet, a trained medical scribe. The creation of this document is based the provider's statements to the medical scribe.  Lissy Sweet November 24, 2017 3:53 PM     OBJECTIVE:                                                    /81 (BP Location: Right arm, Patient Position: Chair, Cuff Size: Adult Large)  Pulse 107  Temp 97.2  F (36.2  C) (Oral)  Ht 1.824 m (5' 11.8\")  Wt 108.4 kg " (239 lb)  SpO2 93%  BMI 32.6 kg/m2   Body mass index is 32.6 kg/(m^2).     GENERAL APPEARANCE ADULT: Alert, no acute distress  EYES: PERRL, EOM normal, conjunctiva and lids normal  HENT: right TM normal, left TM normal, nose dried purulent rhinorrhea bilateral, mucosal erythema, frontal sinus and maxillary sinus tenderness bilaterally, frontal > maxillary, throat/mouth:normal, mucous membranes moist  RESP: lungs clear to auscultation   CV: normal rate, regular rhythm, no murmur or gallop  MS: extremities normal, no peripheral edema  SKIN: no suspicious lesions or rashes  NEURO: Alert, oriented, speech and mentation normal, Cranial nerves 2-12 are normal.  PSYCH: mentation appears normal., affect and mood normal    Diagnostic Test Results:  none      ASSESSMENT/PLAN:                                                      (J01.10) Acute non-recurrent frontal sinusitis  (primary encounter diagnosis)  Comment: Brief response to Augmentin for what may have been frontal sinusitis at the time. I will switch the antibiotic class.  Plan: doxycycline monohydrate 100 MG capsule        Return in about 2 weeks (around 12/8/2017) for recheck if symptoms fail to resolve by then.     (G43.709) Chronic migraine without aura without status migrainosus, not intractable  Comment: He has run out of his sumatriptan because of the sinus infection. He says this one works fairly well also.   Plan: rizatriptan (MAXALT) 10 MG tablet            (Z28.21) Vaccination not carried out because of patient refusal  Comment: Influenza vaccine offered but declined by the patient.   Plan:     The information in this document, created by the medical scribe for me, accurately reflects the services I personally performed and the decisions made by me. I have reviewed and approved this document for accuracy prior to leaving the patient care area. November 24, 2017 3:53 PM   Robson Matos MD

## 2017-11-24 NOTE — PATIENT INSTRUCTIONS
At [facility], we strive to deliver an exceptional experience to you, every time we see you.  If you receive a survey in the mail, please send us back your thoughts. We really do value your feedback.    Based on your medical history, these are the current health maintenance/preventive care services that you are due for (some may have been done at this visit.)  Health Maintenance Due   Topic Date Due     PHQ-9 Q6 MONTHS  07/13/2017     INFLUENZA VACCINE (SYSTEM ASSIGNED)  09/01/2017     DEPRESSION ACTION PLAN Q1 YR  11/08/2017         Suggested websites for health information:  Www.[employer] : Up to date and easily searchable information on multiple topics.  Www.[employer].gov : medication info, interactive tutorials, watch real surgeries online  Www.familydoctor.org : good info from the Academy of Family Physicians  Www.cdc.gov : public health info, travel advisories, epidemics (H1N1)  Www.aap.org : children's health info, normal development, vaccinations  Www.health.[facility].[place].us : [place] dept of health, public health issues in [place], N1N1    Your care team:                            Family Medicine Internal Medicine   MD Jordan Bonilla MD Shantel Branch-Fleming, MD Katya Georgiev PA-C Nam Ho, MD Pediatrics   ZINA Barry, MD Jody Menchaca CNP, MD Deborah Mielke, MD Kim Thein, APRN CNP      Clinic hours: Monday - Thursday 7 am-7 pm; Fridays 7 am-5 pm.   Urgent care: Monday - Friday 11 am-9 pm; Saturday and Sunday 9 am-5 pm.  Pharmacy : Monday -Thursday 8 am-8 pm; Friday 8 am-6 pm; Saturday and Sunday 9 am-5 pm.     Clinic: [phone]   Pharmacy: [phone]

## 2018-02-12 ENCOUNTER — OFFICE VISIT (OUTPATIENT)
Dept: FAMILY MEDICINE | Facility: CLINIC | Age: 43
End: 2018-02-12
Payer: COMMERCIAL

## 2018-02-12 VITALS
WEIGHT: 231.8 LBS | SYSTOLIC BLOOD PRESSURE: 125 MMHG | HEART RATE: 72 BPM | OXYGEN SATURATION: 95 % | RESPIRATION RATE: 16 BRPM | HEIGHT: 70 IN | TEMPERATURE: 98.4 F | DIASTOLIC BLOOD PRESSURE: 80 MMHG | BODY MASS INDEX: 33.18 KG/M2

## 2018-02-12 DIAGNOSIS — G43.719 INTRACTABLE CHRONIC MIGRAINE WITHOUT AURA AND WITHOUT STATUS MIGRAINOSUS: ICD-10-CM

## 2018-02-12 DIAGNOSIS — I10 HYPERTENSION GOAL BP (BLOOD PRESSURE) < 140/90: ICD-10-CM

## 2018-02-12 DIAGNOSIS — Z98.1 STATUS POST CERVICAL SPINAL FUSION: ICD-10-CM

## 2018-02-12 DIAGNOSIS — Z00.01 ENCOUNTER FOR ROUTINE ADULT MEDICAL EXAM WITH ABNORMAL FINDINGS: Primary | ICD-10-CM

## 2018-02-12 LAB
CHOLEST SERPL-MCNC: 210.8 MG/DL (ref 0–200)
CHOLEST/HDLC SERPL: 5.3 {RATIO} (ref 0–5)
GLUCOSE P FAST SERPL-MCNC: 107 MG/DL (ref 51–110)
HDLC SERPL-MCNC: 39.7 MG/DL
LDLC SERPL CALC-MCNC: 131 MG/DL (ref 0–129)
TRIGL SERPL-MCNC: 198.4 MG/DL (ref 0–150)
VLDL CHOLESTEROL: 39.7 MG/DL (ref 7–32)

## 2018-02-12 RX ORDER — METOPROLOL TARTRATE 25 MG/1
25 TABLET, FILM COATED ORAL 2 TIMES DAILY
Qty: 60 TABLET | Refills: 11 | Status: SHIPPED | OUTPATIENT
Start: 2018-02-12 | End: 2018-06-19 | Stop reason: SINTOL

## 2018-02-12 RX ORDER — RIZATRIPTAN BENZOATE 10 MG/1
5-10 TABLET ORAL
Qty: 18 TABLET | Refills: 5 | Status: SHIPPED | OUTPATIENT
Start: 2018-02-12 | End: 2018-08-20

## 2018-02-12 RX ORDER — METOPROLOL TARTRATE 25 MG/1
25 TABLET, FILM COATED ORAL 2 TIMES DAILY
COMMUNITY
Start: 2018-02-01 | End: 2018-02-12

## 2018-02-12 NOTE — PATIENT INSTRUCTIONS
Here is the plan from today's visit    1. Encounter for routine adult medical exam with abnormal findings  - Glucose Fasting ([facility])  - Lipid Panel ([facility])    2. Intractable chronic migraine without aura and without status migrainosus  - rizatriptan (MAXALT) 10 MG tablet; Take 0.5-1 tablets (5-10 mg) by mouth at onset of headache for migraine May repeat every 2 hours as needed.  Dispense: 18 tablet; Refill: 5    3. Status post cervical spinal fusion  - tiZANidine (ZANAFLEX) 4 MG tablet; Take 1-2 tablets (4-8 mg) by mouth 3 times daily as needed for muscle spasms  Dispense: 90 tablet; Refill: 3    Please call or return to clinic if your symptoms don't go away.    Follow up plan  Please make a clinic appointment for follow up with me (PETRA FIGUEROA) in 1 year for recheck.    Thank you for coming to [facility] today.  Lab Testing:  **If you had lab testing today and your results are reassuring or normal they will be mailed to you or sent through [facility] within 7 days.   **If the lab tests need quick action we will call you with the results.  The phone number we will call with results is # [phone] (home) . If this is not the best number please call our clinic and change the number.  Medication Refills:  If you need any refills please call your pharmacy and they will contact us.   If you need to  your refill at a new pharmacy, please contact the new pharmacy directly. The new pharmacy will help you get your medications transferred faster.   Scheduling:  If you have any concerns about today's visit or wish to schedule another appointment please call our office during normal business hours [phone] (8-5:00 M-F)  If a referral was made to a [facility] and you don't get a call from central scheduling please call [phone].  If a Mammogram was ordered for you at [facility] call [phone] to schedule or change your appointment.  If you had an  XRay/CT/Ultrasound/MRI ordered the number is [phone] to schedule or change your radiology appointment.   Medical Concerns:  If you have urgent medical concerns please call [phone] at any time of the day.    Preventive Health Recommendations  Male Ages 40 to 49    Yearly exam:             See your health care provider every year in order to  o   Review health changes.   o   Discuss preventive care.    o   Review your medicines if your doctor has prescribed any.    You should be tested each year for STDs (sexually transmitted diseases) if you re at risk.     Have a cholesterol test every 5 years.     Have a colonoscopy (test for colon cancer) if someone in your family has had colon cancer or polyps before age 50.     After age 45, have a diabetes test (fasting glucose). If you are at risk for diabetes, you should have this test every 3 years.      Talk with your health care provider about whether or not a prostate cancer screening test (PSA) is right for you.    Shots: Get a flu shot each year. Get a tetanus shot every 10 years.     Nutrition:    Eat at least 5 servings of fruits and vegetables daily.     Eat whole-grain bread, whole-wheat pasta and brown rice instead of white grains and rice.     Talk to your provider about Calcium and Vitamin D.     Lifestyle    Exercise for at least 150 minutes a week (30 minutes a day, 5 days a week). This will help you control your weight and prevent disease.     Limit alcohol to one drink per day.     No smoking.     Wear sunscreen to prevent skin cancer.     See your dentist every six months for an exam and cleaning.

## 2018-02-12 NOTE — LETTER
2/12/2018    Re: Ramesh Galvez  1975      To Whom It May Concern:    Ramesh Galvez was seen in clinic today.  He is tobacco free, therefore, I did not feel it necessary to test for cotinine today.  Please feel free to contact me at the clinic if you have any questions.       Sincerely,        Yogesh Medina MD, FAAFP  2/12/2018 10:25 AM

## 2018-02-12 NOTE — MR AVS SNAPSHOT
After Visit Summary   2/12/2018    Ramesh Galvez    MRN: [id number]           Patient Information     Date Of Birth          1975        Visit Information        Provider Department      2/12/2018 9:40 AM Petra Medina MD [facility]        Today's Diagnoses     Encounter for routine adult medical exam with abnormal findings    -  1    Intractable chronic migraine without aura and without status migrainosus        Status post cervical spinal fusion          Care Instructions    Here is the plan from today's visit    1. Encounter for routine adult medical exam with abnormal findings  - Glucose Fasting ([facility])  - Lipid Panel ([facility])    2. Intractable chronic migraine without aura and without status migrainosus  - rizatriptan (MAXALT) 10 MG tablet; Take 0.5-1 tablets (5-10 mg) by mouth at onset of headache for migraine May repeat every 2 hours as needed.  Dispense: 18 tablet; Refill: 5    3. Status post cervical spinal fusion  - tiZANidine (ZANAFLEX) 4 MG tablet; Take 1-2 tablets (4-8 mg) by mouth 3 times daily as needed for muscle spasms  Dispense: 90 tablet; Refill: 3    Please call or return to clinic if your symptoms don't go away.    Follow up plan  Please make a clinic appointment for follow up with me (PETRA MEDINA) in 1 year for recheck.    Thank you for coming to [facility] today.  Lab Testing:  **If you had lab testing today and your results are reassuring or normal they will be mailed to you or sent through [employer] within 7 days.   **If the lab tests need quick action we will call you with the results.  The phone number we will call with results is # [phone] (home) . If this is not the best number please call our clinic and change the number.  Medication Refills:  If you need any refills please call your pharmacy and they will contact us.   If you need to  your refill at a new pharmacy, please contact the new pharmacy directly. The new  pharmacy will help you get your medications transferred faster.   Scheduling:  If you have any concerns about today's visit or wish to schedule another appointment please call our office during normal business hours [phone] (8-5:00 M-F)  If a referral was made to a [facility] and you don't get a call from central scheduling please call [phone].  If a Mammogram was ordered for you at [facility] call [phone] to schedule or change your appointment.  If you had an XRay/CT/Ultrasound/MRI ordered the number is [phone] to schedule or change your radiology appointment.   Medical Concerns:  If you have urgent medical concerns please call [phone] at any time of the day.    Preventive Health Recommendations  Male Ages 40 to 49    Yearly exam:             See your health care provider every year in order to  o   Review health changes.   o   Discuss preventive care.    o   Review your medicines if your doctor has prescribed any.    You should be tested each year for STDs (sexually transmitted diseases) if you re at risk.     Have a cholesterol test every 5 years.     Have a colonoscopy (test for colon cancer) if someone in your family has had colon cancer or polyps before age 50.     After age 45, have a diabetes test (fasting glucose). If you are at risk for diabetes, you should have this test every 3 years.      Talk with your health care provider about whether or not a prostate cancer screening test (PSA) is right for you.    Shots: Get a flu shot each year. Get a tetanus shot every 10 years.     Nutrition:    Eat at least 5 servings of fruits and vegetables daily.     Eat whole-grain bread, whole-wheat pasta and brown rice instead of white grains and rice.     Talk to your provider about Calcium and Vitamin D.     Lifestyle    Exercise for at least 150 minutes a week (30 minutes a day, 5 days a week). This will help you control your weight and prevent disease.     Limit  "alcohol to one drink per day.     No smoking.     Wear sunscreen to prevent skin cancer.     See your dentist every six months for an exam and cleaning.              Follow-ups after your visit        Who to contact     Please call your clinic at [phone] to:    Ask questions about your health    Make or cancel appointments    Discuss your medicines    Learn about your test results    Speak to your doctor            Additional Information About Your Visit        [employer] Information     [employer] gives you secure access to your electronic health record. If you see a primary care provider, you can also send messages to your care team and make appointments. If you have questions, please call your primary care clinic.  If you do not have a primary care provider, please call [phone] and they will assist you.      [employer] is an electronic gateway that provides easy, online access to your medical records. With [employer], you can request a clinic appointment, read your test results, renew a prescription or communicate with your care team.     To access your existing account, please contact your [facility] or call [phone] for assistance.        Care EveryWhere ID     This is your Care EveryWhere ID. This could be used by other organizations to access your [place] medical records  [id number]        Your Vitals Were     Pulse Temperature Respirations Height Pulse Oximetry BMI (Body Mass Index)    72 98.4  F (36.9  C) (Oral) 16 5' 10.47\" (179 cm) 95% 32.82 kg/m2       Blood Pressure from Last 3 Encounters:   02/12/18 125/80   11/24/17 118/81   11/06/17 114/86    Weight from Last 3 Encounters:   02/12/18 231 lb 12.8 oz (105.1 kg)   11/24/17 239 lb (108.4 kg)   11/06/17 243 lb (110.2 kg)              We Performed the Following     Glucose Fasting ([facility])     Lipid Panel ([facility])          Today's Medication Changes          These changes are accurate as of 2/12/18 10:37 AM.  If you " have any questions, ask your nurse or doctor.               These medicines have changed or have updated prescriptions.        Dose/Directions    rizatriptan 10 MG tablet   Commonly known as:  MAXALT   This may have changed:  additional instructions   Used for:  Intractable chronic migraine without aura and without status migrainosus   Changed by:  Yogesh Medina MD        Dose:  5-10 mg   Take 0.5-1 tablets (5-10 mg) by mouth at onset of headache for migraine May repeat every 2 hours as needed.   Quantity:  18 tablet   Refills:  5         Stop taking these medicines if you haven't already. Please contact your care team if you have questions.     hydrochlorothiazide 12.5 MG capsule   Commonly known as:  MICROZIDE   Stopped by:  Yogesh Medina MD           omeprazole 20 MG CR capsule   Commonly known as:  priLOSEC   Stopped by:  Yogesh Medina MD                Where to get your medicines      These medications were sent to [id number] - [facility] [address] [address]     Phone:  [phone]     rizatriptan 10 MG tablet    tiZANidine 4 MG tablet                Primary Care Provider Office Phone # Fax #    Linette Naima Vergara [phone] [address]  [place] [address]        Equal Access to Services     [facility] CenterALISHA AH: Hadii aad ku hadasho Soomaali, waaxda luqadaha, qaybta kaalmada adeegyada, waxay jayyin hayaan gray wang . So [facility] [phone].    ATENCIÓN: Si habla español, tiene a de la rosa disposición servicios gratuitos de asistencia lingüística. Llame al [phone].    We comply with applicable federal civil rights laws and [place] laws. We do not discriminate on the basis of race, color, national origin, age, disability, sex, sexual orientation, or gender identity.            Thank you!     Thank you for choosing [facility]  for your care. Our goal is always to provide you  with excellent care. Hearing back from our patients is one way we can continue to improve our services. Please take a few minutes to complete the written survey that you may receive in the mail after your visit with us. Thank you!             Your Updated Medication List - Protect others around you: Learn how to safely use, store and throw away your medicines at www.disposemymeds.org.          This list is accurate as of 2/12/18 10:37 AM.  Always use your most recent med list.                   Brand Name Dispense Instructions for use Diagnosis    amLODIPine 10 MG tablet    NORVASC    90 tablet    Take 1 tablet (10 mg) by mouth daily    Essential hypertension with goal blood pressure less than 140/90       cetirizine 10 MG tablet    zyrTEC     Take 10 mg by mouth daily        COENZYME Q-10 PO      Take 100 mg by mouth 2 times daily        DAILY MULTIPLE VITAMINS Tabs      Take by mouth daily        EPINEPHrine 0.3 MG/0.3ML injection    EPIPEN 2-SHERMAN    2 each    Inject 0.3 mLs into the muscle once as needed for anaphylaxis.    Toxic effect of venom(989.5)       escitalopram 10 MG tablet    LEXAPRO    90 tablet    Take 1 tablet (10 mg) by mouth daily    Major depressive disorder, recurrent episode, moderate (H)       metoprolol tartrate 25 MG tablet    LOPRESSOR     Take 25 mg by mouth 2 times daily        rizatriptan 10 MG tablet    MAXALT    18 tablet    Take 0.5-1 tablets (5-10 mg) by mouth at onset of headache for migraine May repeat every 2 hours as needed.    Intractable chronic migraine without aura and without status migrainosus       SUMAtriptan 100 MG tablet    IMITREX    18 tablet    Take 1 tablet (100 mg) by mouth at onset of headache for migraine May repeat in 2 hours. Max 2 tablets/24 hours.    Intractable chronic migraine without aura and without status migrainosus       tiZANidine 4 MG tablet    ZANAFLEX    90 tablet    Take 1-2 tablets (4-8 mg) by mouth 3 times daily as needed for muscle spasms     Status post cervical spinal fusion

## 2018-02-12 NOTE — PROGRESS NOTES
New patient here for complete physical.  Patient know to me from my previous practice.    Male Physical Note          HPI         Concerns today: Headache - Migraines  Onset: > 1 year ago    Description:   Location: unilateral but varies as to which side   Character: throbbing pain, sharp pain, squeezing pain  Frequency:  weekly  Duration:  Hours to days  Head trauma?: Yes Details: not recently - history of neck fracture   Able to do daily activities?: Yes   Do you wake with a headache in the am?: no  Do headaches wake you up at night?:no  Daily pain medication use:  no  New life stressors  :no    Intensity: moderate    Progression of Symptoms:  same and intermittent    Accompanying Signs & Symptoms:  Stiff neck: Yes  Neck or upper back pain: Yes  Fever: no  Sinus pressure: no  Nausea or vomitingno  Dizziness  :no  Numbness::no  Weakness (in one part of the body?:no  Visual changes: Yes Details: right eye only at onset of headache     History:   Family history of migraines: no  Previous tests for headaches: no  Neurologist evaluations: Yes     Precipitating factors:   Does light make it worse: no  Does sound make it worse: no    Alleviating factors:  Does sleep help: no  Therapies Tried and outcome: Topamax (no benefit), Gabapentin (worsened depression).  Perhaps tried others but can't remember.     Patient also needs forms for work completed.    Patient Active Problem List   Diagnosis     Allergic rhinitis     Chronic maxillary sinusitis     Dermatophytosis of body     Hypertension goal BP (blood pressure) < 140/90     CARDIOVASCULAR SCREENING; LDL GOAL LESS THAN 160     Migraine headache     Hyperlipemia, mixed     Moderate major depression (H)     History of femur fracture     Gastroesophageal reflux disease with esophagitis     Compression fracture of C-spine, sequela     Cervical stenosis of spine     Status post cervical spinal fusion     Major depressive disorder, recurrent episode, moderate (H)     Chronic  pain due to trauma     Pain of left hand     Mechanical problems with limbs     Closed displaced fracture of third metacarpal bone of left hand with nonunion, unspecified portion of metacarpal, subsequent encounter       Past Medical History:   Diagnosis Date     ALLERGIC RHINITIS NOS 1/18/2007     CHR MAXILLARY SINUSITIS 1/18/2007    Chronic Recurrent     DERMATOPHYTOSIS OF BODY 9/20/2007    Chronic recurrent tinea versicolor     HTN (hypertension) 7/29/2010     Migraines      Other chronic pain     neck, between shoulder blades more on right side, right arm       Previous Medical Care   [facility]     Family History   Problem Relation Age of Onset     Breast Cancer Mother      C.A.D. Father      Hypertension Father      HEART DISEASE Father      DIABETES Maternal Grandfather      Asthma No family hx of      CEREBROVASCULAR DISEASE No family hx of      Cancer - colorectal No family hx of      Prostate Cancer No family hx of               Review of Systems:     Review of Systems:  CONSTITUTIONAL: NEGATIVE for fever, chills, change in weight  INTEGUMENTARY/SKIN: NEGATIVE for worrisome rashes, moles or lesions  EYES: NEGATIVE for vision changes or irritation  ENT/MOUTH: NEGATIVE for ear, mouth and throat problems  RESP: NEGATIVE for significant cough or SOB  BREAST: NEGATIVE for masses, tenderness or discharge  CV: NEGATIVE for chest pain, palpitations or peripheral edema  GI: NEGATIVE for nausea, abdominal pain, heartburn, or change in bowel habits  : NEGATIVE for frequency, dysuria, or hematuria  MUSCULOSKELETAL: neck pain - chronic  NEURO:  As above   ENDOCRINE: NEGATIVE for temperature intolerance, skin/hair changes  HEME/ALLERGY: NEGATIVE for bleeding problems  PSYCHIATRIC: NEGATIVE for changes in mood or affect  Sleep:   Do you snore most or the night (as reported by a family member)? Yes  Do you feel sleepy or extremely tired during most of the day? Yes           Social History     Social History  "    Social History     Marital status:      Spouse name: N/A     Number of children: 2     Years of education: N/A     Occupational History      [employer]     Computer work     Social History Main Topics     Smoking status: Never Smoker     Smokeless tobacco: Never Used     Alcohol use 0.0 oz/week     0 Standard drinks or equivalent per week      Comment: social etoh     Drug use: No     Sexual activity: Yes     Partners: Female     Birth control/ protection: Surgical, None     Other Topics Concern      Service No     Blood Transfusions No     Caffeine Concern No     Occupational Exposure No     Hobby Hazards No     Sleep Concern Yes     snoring, stops breathing at times     Stress Concern No     Weight Concern No     Special Diet No     Back Care Yes     C6 C7 fx     Exercise Yes     1 time per week     Bike Helmet Yes     Seat Belt Yes     Self-Exams Yes     Parent/Sibling W/ Cabg, Mi Or Angioplasty Before 65f 55m? Yes     Father     Social History Narrative    Lives with fiance.      2 healthy children    1 bro - healthy    Parents - alive.  Mother with hypertension    Father with hypertension, diabetes mellitus, chronic pancreatitis, renal failure.        Marital Status: engaged  Who lives in your household?  Patient and fiance    Has anyone hurt you physically, for example by pushing, hitting, slapping or kicking you or forcing you to have sex? Denies  Do you feel threatened or controlled by a partner, ex-partner or anyone in your life? Denies    Sexual Health     Sexual concerns: No   STI History: Neg      Recommended Screening     Cholesterol Level (>44 yo or at risk):  Recommended and patient accepted testing.             Physical Exam:     Vitals: /80  Pulse 72  Temp 98.4  F (36.9  C) (Oral)  Resp 16  Ht 5' 10.47\" (179 cm)  Wt 231 lb 12.8 oz (105.1 kg)  SpO2 95%  BMI 32.82 kg/m2  BMI= Body mass index is 32.82 kg/(m^2).  GENERAL: healthy, alert and no " distress  EYES: Eyes grossly normal to inspection, extraocular movements - intact, and PERRL  HENT: ear canals- normal; TMs- normal; Nose- normal; Mouth- no ulcers, no lesions  NECK: no tenderness, no adenopathy, no asymmetry, no masses, no stiffness; thyroid- normal to palpation  RESP: lungs clear to auscultation - no rales, no rhonchi, no wheezes  CV: regular rates and rhythm, normal S1 S2, no S3 or S4 and no murmur, no click or rub -  ABDOMEN: soft, no tenderness, no  hepatosplenomegaly, no masses, normal bowel sounds  MS: extremities- no gross deformities noted, no edema  SKIN: no suspicious lesions, no rashes  NEURO: strength and tone- normal, sensory exam- grossly normal, mentation- intact, speech- normal, reflexes- symmetric  BACK: no CVA tenderness, no paralumbar tenderness  - male: testicles- normal, no atrophy, no masses;  no inguinal hernias  RECTAL- male: no masses, no hemorhoids, Prostate- symmetric, no  nodularity, no masses, no hypertrophy  PSYCH: Alert and oriented times 3; speech- coherent , normal rate and volume; able to articulate logical thoughts, able to abstract reason, no tangential thoughts, no hallucinations or delusions, affect- normal  LYMPHATICS: ant. cervical- normal, post. cervical- normal, axillary- normal, supraclavicular- normal, inguinal- normal    Assessment and Plan     Ramesh was seen today for physical, forms and refill request.    Diagnoses and all orders for this visit:    Encounter for routine adult medical exam with abnormal findings  -     Glucose Fasting ([facility])  -     Lipid Panel ([facility])  -     Patient has form for work.  Will complete, fax, and mail original to patient   -     Return to clinic for recheck in 1 year, sooner if needed    Intractable chronic migraine without aura and without status migrainosus  -     Refill rizatriptan (MAXALT) 10 MG tablet; Take 0.5-1 tablets (5-10 mg) by mouth at onset of headache for migraine May repeat every 2 hours as  needed.  -     Continue metoprolol tartrate (LOPRESSOR) 25 MG tablet; Take 1 tablet (25 mg) by mouth 2 times daily.  Was started by an outside physician approximately 1 week ago.  If not helping after one month, patient should return to clinic and we can consider starting Elavil at bedtime.    Status post cervical spinal fusion  -     Refill tiZANidine (ZANAFLEX) 4 MG tablet; Take 1-2 tablets (4-8 mg) by mouth 3 times daily as needed for muscle spasms    Hypertension goal BP (blood pressure) < 140/90  -     Refill metoprolol tartrate (LOPRESSOR) 25 MG tablet; Take 1 tablet (25 mg) by mouth 2 times daily  -      Patient not in need of Norvasc refills, but will do so once needed.      Options for treatment and follow-up care were reviewed with the patient. Ramesh Galvez and/or guardian engaged in the decision making process and verbalized understanding of the options discussed and agreed with the final plan.    10 min spent in direct face to face time with this patient, greater than 50% in counseling regarding headaches above and beyond the routine preventative health maintenance.    Yogesh Medina MD

## 2018-02-13 ENCOUNTER — MYC MEDICAL ADVICE (OUTPATIENT)
Dept: FAMILY MEDICINE | Facility: CLINIC | Age: 43
End: 2018-02-13

## 2018-02-26 ENCOUNTER — MYC MEDICAL ADVICE (OUTPATIENT)
Dept: FAMILY MEDICINE | Facility: CLINIC | Age: 43
End: 2018-02-26

## 2018-05-07 DIAGNOSIS — F33.1 MAJOR DEPRESSIVE DISORDER, RECURRENT EPISODE, MODERATE (H): ICD-10-CM

## 2018-05-07 DIAGNOSIS — I10 ESSENTIAL HYPERTENSION WITH GOAL BLOOD PRESSURE LESS THAN 140/90: ICD-10-CM

## 2018-05-07 NOTE — TELEPHONE ENCOUNTER
"Requested Prescriptions   Pending Prescriptions Disp Refills     escitalopram (LEXAPRO) 10 MG tablet [Pharmacy Med Name: ESCITALOPRAM OXALATE 10MG TB]  Last Written Prescription Date:  04/05/17  Last Fill Quantity: 90,  # refills: 3   Last Office Visit with [facility], [facility] or [facility] prescribing provider:  02/12/18   Future Office Visit:    90 tablet 3     Sig: TAKE ONE TABLET BY MOUTH EVERY DAY    SSRIs Protocol Failed    5/7/2018  3:52 AM       Failed - PHQ-9 score less than 5 in past 6 months    Please review last PHQ-9 score.          Passed - Patient is age 18 or older       Passed - Recent (6 mo) or future (30 days) visit within the authorizing provider's specialty    Patient had office visit in the last 6 months or has a visit in the next 30 days with authorizing provider or within the authorizing provider's specialty.  See \"Patient Info\" tab in inbasket, or \"Choose Columns\" in Meds & Orders section of the refill encounter.            hydrochlorothiazide (MICROZIDE) 12.5 MG capsule [Pharmacy Med Name: HYDROCHLOROTHIAZIDE 12.5MG C]  Last Written Prescription Date:  04/05/17  Last Fill Quantity: 90,  # refills: 3   Last Office Visit with [facility], [facility] or [facility] prescribing provider:  02/12/18   Future Office Visit:    90 capsule 3     Sig: TAKE ONE CAPSULE BY MOUTH EVERY DAY    Diuretics (Including Combos) Protocol Failed    5/7/2018  3:52 AM       Failed - Normal serum creatinine on file in past 12 months    Recent Labs   Lab Test  01/13/17   1527   CR  1.00             Failed - Normal serum potassium on file in past 12 months    Recent Labs   Lab Test  01/13/17   1527   POTASSIUM  3.8                   Failed - Normal serum sodium on file in past 12 months    Recent Labs   Lab Test  01/13/17   1527   NA  138             Passed - Blood pressure under 140/90 in past 12 months    BP Readings from Last 3 Encounters:   02/12/18 125/80   11/24/17 118/81   11/06/17 114/86                Passed - Recent (12 mo) or future (30 " "days) visit within the authorizing provider's specialty    Patient had office visit in the last 12 months or has a visit in the next 30 days with authorizing provider or within the authorizing provider's specialty.  See \"Patient Info\" tab in inbasket, or \"Choose Columns\" in Meds & Orders section of the refill encounter.           Passed - Patient is age 18 or older        amLODIPine (NORVASC) 10 MG tablet [Pharmacy Med Name: AMLODIPINE BESYLATE 10MG TAB]  Last Written Prescription Date:  04/05/17  Last Fill Quantity: 90,  # refills: 3   Last Office Visit with [facility] or [facility] prescribing provider:  02/12/18   Future Office Visit:    90 tablet 3     Sig: TAKE ONE TABLET BY MOUTH EVERY DAY    Calcium Channel Blockers Protocol  Failed    5/7/2018  3:52 AM       Failed - Normal serum creatinine on file in past 12 months    Recent Labs   Lab Test  01/13/17   1527   CR  1.00            Passed - Blood pressure under 140/90 in past 12 months    BP Readings from Last 3 Encounters:   02/12/18 125/80   11/24/17 118/81   11/06/17 114/86                Passed - Recent (12 mo) or future (30 days) visit within the authorizing provider's specialty    Patient had office visit in the last 12 months or has a visit in the next 30 days with authorizing provider or within the authorizing provider's specialty.  See \"Patient Info\" tab in inbasket, or \"Choose Columns\" in Meds & Orders section of the refill encounter.           Passed - Patient is age 18 or older          "

## 2018-05-10 RX ORDER — AMLODIPINE BESYLATE 10 MG/1
10 TABLET ORAL DAILY
Qty: 90 TABLET | Refills: 0 | Status: SHIPPED | OUTPATIENT
Start: 2018-05-10 | End: 2018-08-14

## 2018-05-10 RX ORDER — ESCITALOPRAM OXALATE 10 MG/1
10 TABLET ORAL DAILY
Qty: 90 TABLET | Refills: 1 | Status: SHIPPED | OUTPATIENT
Start: 2018-05-10 | End: 2018-06-19

## 2018-05-10 RX ORDER — HYDROCHLOROTHIAZIDE 12.5 MG/1
12.5 CAPSULE ORAL EVERY MORNING
Qty: 90 CAPSULE | Refills: 0 | Status: SHIPPED | OUTPATIENT
Start: 2018-05-10 | End: 2018-06-19

## 2018-05-10 NOTE — TELEPHONE ENCOUNTER
Routing refill request to provider for review/approval because:  escitalopram - Labs not current:  PHQ-9     Routing refill request to provider for review/approval because:  Hydrochlorothiazide -  Drug not active on patient's medication list  Labs not current:  Creat, K+, Na+    Routing refill request to provider for review/approval because:  Amlodipine - Labs not current:  Darrell Michele RN    Routing to provider who last prescribed medications.

## 2018-05-22 ENCOUNTER — MYC MEDICAL ADVICE (OUTPATIENT)
Dept: FAMILY MEDICINE | Facility: CLINIC | Age: 43
End: 2018-05-22

## 2018-06-04 ENCOUNTER — MYC MEDICAL ADVICE (OUTPATIENT)
Dept: FAMILY MEDICINE | Facility: CLINIC | Age: 43
End: 2018-06-04

## 2018-06-19 ENCOUNTER — OFFICE VISIT (OUTPATIENT)
Dept: FAMILY MEDICINE | Facility: CLINIC | Age: 43
End: 2018-06-19
Payer: COMMERCIAL

## 2018-06-19 VITALS
OXYGEN SATURATION: 98 % | WEIGHT: 228.4 LBS | RESPIRATION RATE: 18 BRPM | DIASTOLIC BLOOD PRESSURE: 75 MMHG | HEART RATE: 76 BPM | SYSTOLIC BLOOD PRESSURE: 113 MMHG | BODY MASS INDEX: 32.33 KG/M2 | TEMPERATURE: 98.3 F

## 2018-06-19 DIAGNOSIS — G43.709 CHRONIC MIGRAINE WITHOUT AURA WITHOUT STATUS MIGRAINOSUS, NOT INTRACTABLE: Primary | ICD-10-CM

## 2018-06-19 RX ORDER — METOPROLOL SUCCINATE 25 MG/1
25 TABLET, EXTENDED RELEASE ORAL DAILY
Qty: 30 TABLET | Refills: 5 | Status: SHIPPED | OUTPATIENT
Start: 2018-06-19 | End: 2018-07-24

## 2018-06-19 NOTE — PROGRESS NOTES
HPI:       Ramesh Galvez is a 43 year old who presents for the following    Patient here to discuss migraine preventatives.  Has tried Elavil, gabapentin, and topamax in the past but side effects prevented their use.    He has had good control of migraines on metoprolol 25 mg two times a day, but had significant ED symptoms.  He has just weaned off Lexapro 4 days ago.  Continues to see his therapist.  He is wondering about other options for his migraine prophylaxis.    Problem, Medication and Allergy Lists were reviewed and are current..    Patient is an established patient of this clinic.         Physical Exam:     Vitals:    06/19/18 0918   BP: 113/75   Pulse: 76   Resp: 18   Temp: 98.3  F (36.8  C)   TempSrc: Oral   SpO2: 98%   Weight: 228 lb 6.4 oz (103.6 kg)     Body mass index is 32.33 kg/(m^2).  Vitals were reviewed and were normal  GENERAL: healthy, alert and no distress  PSYCH: Alert and oriented times 3; coherent speech, normal rate and volume, able to articulate logical thoughts, able to abstract reason, no tangential thoughts, no hallucinations or delusions. Affect is normal.      Results:     None     Assessment and Plan     1. Chronic migraine without aura without status migrainosus, not intractable  Will trial a change to once daily metoprolol succinate (TOPROL-XL) 25 MG 24 hr tablet; Take 1 tablet (25 mg) by mouth daily  Dispense: 30 tablet; Refill: 5 [half the dose of his previous].  Will see if decreasing the dose will still control his migraines, BP, and not cause ED.  He will take at bedtime as well (after intercourse) to see if that will also help.  Will also place Neurology referral for evaluation and treatment for other possible options.    Patient getting  in mid-July.  Told him I would do a one time prescription for Viagra at that time if still having issues while we continue to work on fixing the cause of his ED.  - NEUROLOGY ADULT REFERRAL - INTERNAL    15 min spent in  direct face to face time with this patient, greater than 50% in counseling regarding above.    Options for treatment and follow-up care were reviewed with the patient. Ramesh Galvez  engaged in the decision making process and verbalized understanding of the options discussed and agreed with the final plan.    Yogesh Medina MD

## 2018-06-19 NOTE — MR AVS SNAPSHOT
After Visit Summary   6/19/2018    Ramesh Galvez    MRN: [id number]           Patient Information     Date Of Birth          1975        Visit Information        Provider Department      6/19/2018 9:00 AM Yogesh Medina MD [facility]        Today's Diagnoses     Chronic migraine without aura without status migrainosus, not intractable    -  1      Care Instructions    Here is the plan from today's visit    1. Chronic migraine without aura without status migrainosus, not intractable  Discontinue the two times a day metoprolol.  Change to the once daily version.  Continue to monitor as the lexapro leaves the system  - metoprolol succinate (TOPROL-XL) 25 MG 24 hr tablet; Take 1 tablet (25 mg) by mouth daily  Dispense: 30 tablet; Refill: 5  - MyChart me 1 week before the wedding if still having issues with erections.    Follow up with neurology to discuss other options regarding migraine prevention    Thank you for coming to [facility] today.  Lab Testing:  **If you had lab testing today and your results are reassuring or normal they will be mailed to you or sent through [employer] within 7 days.   **If the lab tests need quick action we will call you with the results.  The phone number we will call with results is # [phone] (home) . If this is not the best number please call our clinic and change the number.  Medication Refills:  If you need any refills please call your pharmacy and they will contact us.   If you need to  your refill at a new pharmacy, please contact the new pharmacy directly. The new pharmacy will help you get your medications transferred faster.   Scheduling:  If you have any concerns about today's visit or wish to schedule another appointment please call our office during normal business hours [phone] (8-5:00 M-F)  If a referral was made to a [facility] and you don't get a call from central scheduling please  call [phone].  If a Mammogram was ordered for you at [facility] call [phone] to schedule or change your appointment.  If you had an XRay/CT/Ultrasound/MRI ordered the number is [phone] to schedule or change your radiology appointment.   Medical Concerns:  If you have urgent medical concerns please call [phone] at any time of the day.    Yogesh Medina MD            Follow-ups after your visit        Additional Services     NEUROLOGY ADULT REFERRAL - INTERNAL       Your provider has referred you for the following:   Consult at [facility]: [facility] [phone]   [email or web address]    Please be aware that coverage of these services is subject to the terms and limitations of your health insurance plan.  Call member services at your health plan with any benefit or coverage questions.      Please bring the following with you to your appointment:    (1) Any X-Rays, CTs or MRIs which have been performed.  Contact the facility where they were done to arrange for  prior to your scheduled appointment.    (2) List of current medications  (3) This referral request   (4) Any documents/labs given to you for this referral                  Who to contact     Please call your clinic at [phone] to:    Ask questions about your health    Make or cancel appointments    Discuss your medicines    Learn about your test results    Speak to your doctor            Additional Information About Your Visit        [employer] Information     [employer] gives you secure access to your electronic health record. If you see a primary care provider, you can also send messages to your care team and make appointments. If you have questions, please call your primary care clinic.  If you do not have a primary care provider, please call [phone] and they will assist you.      [employer] is an electronic gateway that provides easy, online  access to your medical records. With [employer], you can request a clinic appointment, read your test results, renew a prescription or communicate with your care team.     To access your existing account, please contact your [facility] or call [phone] for assistance.        Care EveryWhere ID     This is your Care EveryWhere ID. This could be used by other organizations to access your [place] medical records  [id number]        Your Vitals Were     Pulse Temperature Respirations Pulse Oximetry BMI (Body Mass Index)       76 98.3  F (36.8  C) (Oral) 18 98% 32.33 kg/m2        Blood Pressure from Last 3 Encounters:   06/19/18 113/75   02/12/18 125/80   11/24/17 118/81    Weight from Last 3 Encounters:   06/19/18 228 lb 6.4 oz (103.6 kg)   02/12/18 231 lb 12.8 oz (105.1 kg)   11/24/17 239 lb (108.4 kg)              We Performed the Following     NEUROLOGY ADULT REFERRAL - INTERNAL          Today's Medication Changes          These changes are accurate as of 6/19/18  9:46 AM.  If you have any questions, ask your nurse or doctor.               Start taking these medicines.        Dose/Directions    metoprolol succinate 25 MG 24 hr tablet   Commonly known as:  TOPROL-XL   Used for:  Chronic migraine without aura without status migrainosus, not intractable        Dose:  25 mg   Take 1 tablet (25 mg) by mouth daily   Quantity:  30 tablet   Refills:  5         Stop taking these medicines if you haven't already. Please contact your care team if you have questions.     escitalopram 10 MG tablet   Commonly known as:  LEXAPRO           hydrochlorothiazide 12.5 MG capsule   Commonly known as:  MICROZIDE           metoprolol tartrate 25 MG tablet   Commonly known as:  LOPRESSOR                Where to get your medicines      These medications were sent to [id number] [place], [place] - [address] [address]     Phone:  [phone]      metoprolol succinate 25 MG 24 hr tablet                Primary Care Provider Office Phone # Fax #    Yogesh Medina [phone]       [address] [facility] [address]        Equal Access to Services     PENELOPE SPANN : Epifanio ameya barber monicao Sobirdie, waaxda luqadaha, qaybta kaalmada adeheriberto, magalis millslisa rubi. So [facility] [phone].    ATENCIÓN: Si habla español, tiene a de la rosa disposición servicios gratuitos de asistencia lingüística. Llame al [phone].    We comply with applicable federal civil rights laws and [place] laws. We do not discriminate on the basis of race, color, national origin, age, disability, sex, sexual orientation, or gender identity.            Thank you!     Thank you for choosing [facility]  for your care. Our goal is always to provide you with excellent care. Hearing back from our patients is one way we can continue to improve our services. Please take a few minutes to complete the written survey that you may receive in the mail after your visit with us. Thank you!             Your Updated Medication List - Protect others around you: Learn how to safely use, store and throw away your medicines at www.disposemymeds.org.          This list is accurate as of 6/19/18  9:46 AM.  Always use your most recent med list.                   Brand Name Dispense Instructions for use Diagnosis    amLODIPine 10 MG tablet    NORVASC    90 tablet    Take 1 tablet (10 mg) by mouth daily    Essential hypertension with goal blood pressure less than 140/90       cetirizine 10 MG tablet    zyrTEC     Take 10 mg by mouth daily        COENZYME Q-10 PO      Take 100 mg by mouth 2 times daily        DAILY MULTIPLE VITAMINS Tabs      Take by mouth daily        EPINEPHrine 0.3 MG/0.3ML injection    EPIPEN 2-SHERMAN    2 each    Inject 0.3 mLs into the muscle once as needed for anaphylaxis.    Toxic effect of venom(989.5)       metoprolol succinate 25 MG 24  hr tablet    TOPROL-XL    30 tablet    Take 1 tablet (25 mg) by mouth daily    Chronic migraine without aura without status migrainosus, not intractable       rizatriptan 10 MG tablet    MAXALT    18 tablet    Take 0.5-1 tablets (5-10 mg) by mouth at onset of headache for migraine May repeat every 2 hours as needed.    Intractable chronic migraine without aura and without status migrainosus       SUMAtriptan 100 MG tablet    IMITREX    18 tablet    Take 1 tablet (100 mg) by mouth at onset of headache for migraine May repeat in 2 hours. Max 2 tablets/24 hours.    Intractable chronic migraine without aura and without status migrainosus       tiZANidine 4 MG tablet    ZANAFLEX    90 tablet    Take 1-2 tablets (4-8 mg) by mouth 3 times daily as needed for muscle spasms    Status post cervical spinal fusion

## 2018-06-19 NOTE — PATIENT INSTRUCTIONS
Here is the plan from today's visit    1. Chronic migraine without aura without status migrainosus, not intractable  Discontinue the two times a day metoprolol.  Change to the once daily version.  Continue to monitor as the lexapro leaves the system  - metoprolol succinate (TOPROL-XL) 25 MG 24 hr tablet; Take 1 tablet (25 mg) by mouth daily  Dispense: 30 tablet; Refill: 5  - MyChart me 1 week before the wedding if still having issues with erections.    Follow up with neurology to discuss other options regarding migraine prevention    Thank you for coming to [facility] today.  Lab Testing:  **If you had lab testing today and your results are reassuring or normal they will be mailed to you or sent through [employer] within 7 days.   **If the lab tests need quick action we will call you with the results.  The phone number we will call with results is # [phone] (home) . If this is not the best number please call our clinic and change the number.  Medication Refills:  If you need any refills please call your pharmacy and they will contact us.   If you need to  your refill at a new pharmacy, please contact the new pharmacy directly. The new pharmacy will help you get your medications transferred faster.   Scheduling:  If you have any concerns about today's visit or wish to schedule another appointment please call our office during normal business hours [phone] (8-5:00 M-F)  If a referral was made to a [facility] and you don't get a call from central scheduling please call [phone].  If a Mammogram was ordered for you at [facility] call [phone] to schedule or change your appointment.  If you had an XRay/CT/Ultrasound/MRI ordered the number is [phone] to schedule or change your radiology appointment.   Medical Concerns:  If you have urgent medical concerns please call [phone] at any time of the day.    Yogesh Medina MD

## 2018-07-04 ENCOUNTER — MYC MEDICAL ADVICE (OUTPATIENT)
Dept: FAMILY MEDICINE | Facility: CLINIC | Age: 43
End: 2018-07-04

## 2018-07-24 ENCOUNTER — OFFICE VISIT (OUTPATIENT)
Dept: FAMILY MEDICINE | Facility: CLINIC | Age: 43
End: 2018-07-24
Payer: COMMERCIAL

## 2018-07-24 VITALS
HEART RATE: 89 BPM | DIASTOLIC BLOOD PRESSURE: 83 MMHG | WEIGHT: 237.8 LBS | RESPIRATION RATE: 16 BRPM | TEMPERATURE: 99.3 F | OXYGEN SATURATION: 96 % | SYSTOLIC BLOOD PRESSURE: 126 MMHG | BODY MASS INDEX: 33.67 KG/M2

## 2018-07-24 DIAGNOSIS — T63.451S TOXIC REACTION TO HORNETS, WASPS AND BEES, ACCIDENTAL OR UNINTENTIONAL, SEQUELA: ICD-10-CM

## 2018-07-24 DIAGNOSIS — T63.441S TOXIC REACTION TO HORNETS, WASPS AND BEES, ACCIDENTAL OR UNINTENTIONAL, SEQUELA: ICD-10-CM

## 2018-07-24 DIAGNOSIS — T63.461S TOXIC REACTION TO HORNETS, WASPS AND BEES, ACCIDENTAL OR UNINTENTIONAL, SEQUELA: ICD-10-CM

## 2018-07-24 DIAGNOSIS — J01.00 ACUTE MAXILLARY SINUSITIS, RECURRENCE NOT SPECIFIED: Primary | ICD-10-CM

## 2018-07-24 DIAGNOSIS — G43.709 CHRONIC MIGRAINE WITHOUT AURA WITHOUT STATUS MIGRAINOSUS, NOT INTRACTABLE: ICD-10-CM

## 2018-07-24 DIAGNOSIS — K21.00 GASTROESOPHAGEAL REFLUX DISEASE WITH ESOPHAGITIS: ICD-10-CM

## 2018-07-24 RX ORDER — EPINEPHRINE 0.3 MG/.3ML
0.3 INJECTION SUBCUTANEOUS PRN
Qty: 0.6 ML | Refills: 1 | Status: SHIPPED | OUTPATIENT
Start: 2018-07-24

## 2018-07-24 RX ORDER — METOPROLOL SUCCINATE 25 MG/1
12.5 TABLET, EXTENDED RELEASE ORAL DAILY
Qty: 30 TABLET | Refills: 5
Start: 2018-07-24 | End: 2018-08-14

## 2018-07-24 NOTE — PROGRESS NOTES
HPI       Ramesh Galvez is a 43 year old  who presents for   Chief Complaint   Patient presents with     Cough     x's 2 weeks. Chest congestion. Non stop coughing and waking him up at night.     Refill Request     [facility]       Acute Illness   Concerns: cough and chest congestion  When did it start? 2 weeks ago  Is it getting better, worse or staying the same?  Initially better, but then worse: yes the cough    Fatigue/Achiness?:No     Fever?: No     Chills/Sweats?: No     Headache (location?) YES frontal and occipital area. Sometimes behind the left eye    Sinus Pressure?: YES     Eye redness/Discharge?: No     Ear Pain?: No     Runny nose?: No     Congestion?: No     Sore Throat?:  YES only during the first week    Respiratory    Cough?:  YES-non-productive, worsening over time    Wheeze?: No     GI/    Decreased Appetite?: No     Nausea?:No     Vomiting?: No     Diarrhea?:  No     Dysuria/Frequency?.:No       Any Illness Exposure?: No     Any foreign travel or contact with anyone ill who travelled abroad? No     Therapies Tried and outcome: Mucinex, desylm 12hr and cough drops. No relief.      Problem, Medication and Allergy Lists were reviewed and updated if needed..    Patient is an established patient of this clinic..         Review of Systems:   Review of Systems         Physical Exam:     Vitals:    07/24/18 1626 07/24/18 1627   BP: (!) 140/93 126/83   Pulse: 89    Resp: 16    Temp: 99.3  F (37.4  C)    TempSrc: Oral    SpO2: 96%    Weight: 237 lb 12.8 oz (107.9 kg)      Body mass index is 33.67 kg/(m^2).  Vitals were reviewed and were normal     Physical Exam   Constitutional: He appears well-developed and well-nourished. No distress.   HENT:   Right Ear: Tympanic membrane, external ear and ear canal normal.   Left Ear: Tympanic membrane, external ear and ear canal normal.   Nose: Rhinorrhea (Thick, yellow) present.   Mouth/Throat: Uvula is midline and mucous membranes are normal. Posterior  oropharyngeal erythema (Post nasal drip) present. No oropharyngeal exudate.   Eyes: Left eye exhibits no discharge.   Neck: Neck supple.   Cardiovascular: Normal rate, regular rhythm and normal heart sounds.    No murmur heard.  Pulmonary/Chest: Effort normal and breath sounds normal. No respiratory distress. He has no wheezes. He has no rales.   Lymphadenopathy:     He has no cervical adenopathy.   Skin: Skin is warm and dry. No rash noted.   Psychiatric: He has a normal mood and affect.   Nursing note and vitals reviewed.        Results:   None     Assessment and Plan        1. Acute maxillary sinusitis, recurrence not specified  - amoxicillin-clavulanate (AUGMENTIN) 875-125 MG per tablet; Take 1 tablet by mouth 2 times daily for 10 days  Dispense: 20 tablet; Refill: 0    Prior to leaving, patient requests the following refills and updates to his medications  2. Toxic reaction to hornets, wasps and bees, accidental or unintentional, sequela  - EPINEPHrine (EPIPEN/ADRENACLICK/OR ANY BX GENERIC EQUIV) 0.3 MG/0.3ML injection 2-pack; Inject 0.3 mLs (0.3 mg) into the muscle as needed for anaphylaxis  Dispense: 0.6 mL; Refill: 1    3. Chronic migraine without aura without status migrainosus, not intractable  Reports decreasing from 25 mg daily to 12.5 mg  - metoprolol succinate (TOPROL-XL) 25 MG 24 hr tablet; Take 0.5 tablets (12.5 mg) by mouth daily  Dispense: 30 tablet; Refill: 5    4. Gastroesophageal reflux disease with esophagitis  - omeprazole (PRILOSEC) 20 MG CR capsule; Take 1 capsule (20 mg) by mouth daily  Dispense: 90 capsule; Refill: 3         Options for treatment and follow-up care were reviewed with the patient. Ramesh Galvez  engaged in the decision making process and verbalized understanding of the options discussed and agreed with the final plan.    Yogesh Medina MD

## 2018-07-24 NOTE — MR AVS SNAPSHOT
After Visit Summary   7/24/2018    Ramesh Galvez    MRN: [id number]           Patient Information     Date Of Birth          1975        Visit Information        Provider Department      7/24/2018 4:20 PM Yogesh Medina MD [facility]        Today's Diagnoses     Acute maxillary sinusitis, recurrence not specified    -  1    Toxic reaction to hornets, wasps and bees, accidental or unintentional, sequela          Care Instructions    Here is the plan from today's visit    1. Acute maxillary sinusitis, recurrence not specified  - amoxicillin-clavulanate (AUGMENTIN) 875-125 MG per tablet; Take 1 tablet by mouth 2 times daily for 10 days  Dispense: 20 tablet; Refill: 0    2. Toxic reaction to hornets, wasps and bees, accidental or unintentional, sequela  - EPINEPHrine (EPIPEN/ADRENACLICK/OR ANY BX GENERIC EQUIV) 0.3 MG/0.3ML injection 2-pack; Inject 0.3 mLs (0.3 mg) into the muscle as needed for anaphylaxis  Dispense: 0.6 mL; Refill: 1    Please call or return to clinic if your symptoms don't go away.    Thank you for coming to [facility] today.  Lab Testing:  **If you had lab testing today and your results are reassuring or normal they will be mailed to you or sent through [employer] within 7 days.   **If the lab tests need quick action we will call you with the results.  The phone number we will call with results is # [phone] (home) . If this is not the best number please call our clinic and change the number.  Medication Refills:  If you need any refills please call your pharmacy and they will contact us.   If you need to  your refill at a new pharmacy, please contact the new pharmacy directly. The new pharmacy will help you get your medications transferred faster.   Scheduling:  If you have any concerns about today's visit or wish to schedule another appointment please call our office during normal business hours [phone] (8-5:00 M-F)  If a referral  was made to a [facility] and you don't get a call from central scheduling please call [phone].  If a Mammogram was ordered for you at [facility] call [phone] to schedule or change your appointment.  If you had an XRay/CT/Ultrasound/MRI ordered the number is [phone] to schedule or change your radiology appointment.   Medical Concerns:  If you have urgent medical concerns please call [phone] at any time of the day.    Yogesh Medina MD            Follow-ups after your visit        Who to contact     Please call your clinic at [phone] to:    Ask questions about your health    Make or cancel appointments    Discuss your medicines    Learn about your test results    Speak to your doctor            Additional Information About Your Visit        [employer] Information     [employer] gives you secure access to your electronic health record. If you see a primary care provider, you can also send messages to your care team and make appointments. If you have questions, please call your primary care clinic.  If you do not have a primary care provider, please call [phone] and they will assist you.      [employer] is an electronic gateway that provides easy, online access to your medical records. With [employer], you can request a clinic appointment, read your test results, renew a prescription or communicate with your care team.     To access your existing account, please contact your [facility] or call [phone] for assistance.        Care EveryWhere ID     This is your Care EveryWhere ID. This could be used by other organizations to access your [place] medical records  [id number]        Your Vitals Were     Pulse Temperature Respirations Pulse Oximetry BMI (Body Mass Index)       89 99.3  F (37.4  C) (Oral) 16 96% 33.67 kg/m2        Blood Pressure from Last 3 Encounters:   07/24/18 126/83   06/19/18 113/75   02/12/18 125/80    Weight from  Last 3 Encounters:   07/24/18 237 lb 12.8 oz (107.9 kg)   06/19/18 228 lb 6.4 oz (103.6 kg)   02/12/18 231 lb 12.8 oz (105.1 kg)              Today, you had the following     No orders found for display         Today's Medication Changes          These changes are accurate as of 7/24/18  4:47 PM.  If you have any questions, ask your nurse or doctor.               Start taking these medicines.        Dose/Directions    amoxicillin-clavulanate 875-125 MG per tablet   Commonly known as:  AUGMENTIN   Used for:  Acute maxillary sinusitis, recurrence not specified   Started by:  Yogesh Medina MD        Dose:  1 tablet   Take 1 tablet by mouth 2 times daily for 10 days   Quantity:  20 tablet   Refills:  0       EPINEPHrine 0.3 MG/0.3ML injection 2-pack   Commonly known as:  EPIPEN/ADRENACLICK/or ANY BX GENERIC EQUIV   Used for:  Toxic reaction to hornets, wasps and bees, accidental or unintentional, sequela   Replaces:  EPINEPHrine 0.3 MG/0.3ML injection   Started by:  Yogesh Medina MD        Dose:  0.3 mg   Inject 0.3 mLs (0.3 mg) into the muscle as needed for anaphylaxis   Quantity:  0.6 mL   Refills:  1         Stop taking these medicines if you haven't already. Please contact your care team if you have questions.     EPINEPHrine 0.3 MG/0.3ML injection   Commonly known as:  EPIPEN 2-SHERMAN   Replaced by:  EPINEPHrine 0.3 MG/0.3ML injection 2-pack   Stopped by:  Yogesh Medina MD           SUMAtriptan 100 MG tablet   Commonly known as:  IMITREX   Stopped by:  Yogesh Medina MD                Where to get your medicines      These medications were sent to [id number] - [place], [place] - [address] [address]     Phone:  [phone]     amoxicillin-clavulanate 875-125 MG per tablet    EPINEPHrine 0.3 MG/0.3ML injection 2-pack                Primary Care Provider Office Phone # Fax #    Yogesh Medina [phone]       [address]  [facility] [address]        Equal Access to Services     BRYANT SPANN : Hadii aad ku hadtaikaron Kikiali, waaxda luqadaha, qaybta kaalmada kimi, magalis rubi. So [facility] [phone].    ATENCIÓN: Si habla español, tiene a de la rosa disposición servicios gratuitos de asistencia lingüística. Jamie al [phone].    We comply with applicable federal civil rights laws and [place] laws. We do not discriminate on the basis of race, color, national origin, age, disability, sex, sexual orientation, or gender identity.            Thank you!     [facility]  for your care. Our goal is always to provide you with excellent care. Hearing back from our patients is one way we can continue to improve our services. Please take a few minutes to complete the written survey that you may receive in the mail after your visit with us. Thank you!             Your Updated Medication List - Protect others around you: Learn how to safely use, store and throw away your medicines at www.disposemymeds.org.          This list is accurate as of 7/24/18  4:47 PM.  Always use your most recent med list.                   Brand Name Dispense Instructions for use Diagnosis    amLODIPine 10 MG tablet    NORVASC    90 tablet    Take 1 tablet (10 mg) by mouth daily    Essential hypertension with goal blood pressure less than 140/90       amoxicillin-clavulanate 875-125 MG per tablet    AUGMENTIN    20 tablet    Take 1 tablet by mouth 2 times daily for 10 days    Acute maxillary sinusitis, recurrence not specified       cetirizine 10 MG tablet    zyrTEC     Take 10 mg by mouth daily        COENZYME Q-10 PO      Take 100 mg by mouth 2 times daily        DAILY MULTIPLE VITAMINS Tabs      Take by mouth daily        EPINEPHrine 0.3 MG/0.3ML injection 2-pack    EPIPEN/ADRENACLICK/or ANY BX GENERIC EQUIV    0.6 mL    Inject 0.3 mLs (0.3 mg) into the muscle as needed for anaphylaxis    Toxic  reaction to hornets, wasps and bees, accidental or unintentional, sequela       metoprolol succinate 25 MG 24 hr tablet    TOPROL-XL    30 tablet    Take 1 tablet (25 mg) by mouth daily    Chronic migraine without aura without status migrainosus, not intractable       rizatriptan 10 MG tablet    MAXALT    18 tablet    Take 0.5-1 tablets (5-10 mg) by mouth at onset of headache for migraine May repeat every 2 hours as needed.    Intractable chronic migraine without aura and without status migrainosus       tiZANidine 4 MG tablet    ZANAFLEX    90 tablet    Take 1-2 tablets (4-8 mg) by mouth 3 times daily as needed for muscle spasms    Status post cervical spinal fusion

## 2018-07-24 NOTE — PATIENT INSTRUCTIONS
Here is the plan from today's visit    1. Acute maxillary sinusitis, recurrence not specified  - amoxicillin-clavulanate (AUGMENTIN) 875-125 MG per tablet; Take 1 tablet by mouth 2 times daily for 10 days  Dispense: 20 tablet; Refill: 0    2. Toxic reaction to hornets, wasps and bees, accidental or unintentional, sequela  - EPINEPHrine (EPIPEN/ADRENACLICK/OR ANY BX GENERIC EQUIV) 0.3 MG/0.3ML injection 2-pack; Inject 0.3 mLs (0.3 mg) into the muscle as needed for anaphylaxis  Dispense: 0.6 mL; Refill: 1    Please call or return to clinic if your symptoms don't go away.    Thank you for coming to [facility] today.  Lab Testing:  **If you had lab testing today and your results are reassuring or normal they will be mailed to you or sent through [employer] within 7 days.   **If the lab tests need quick action we will call you with the results.  The phone number we will call with results is # [phone] (home) . If this is not the best number please call our clinic and change the number.  Medication Refills:  If you need any refills please call your pharmacy and they will contact us.   If you need to  your refill at a new pharmacy, please contact the new pharmacy directly. The new pharmacy will help you get your medications transferred faster.   Scheduling:  If you have any concerns about today's visit or wish to schedule another appointment please call our office during normal business hours [phone] (8-5:00 M-F)  If a referral was made to a [facility] and you don't get a call from central scheduling please call [phone].  If a Mammogram was ordered for you at [facility] call [phone] to schedule or change your appointment.  If you had an XRay/CT/Ultrasound/MRI ordered the number is [phone] to schedule or change your radiology appointment.   Medical Concerns:  If you have urgent medical concerns please call [phone] at any time of the day.    Yogesh Kaur  MD [place]

## 2018-08-14 DIAGNOSIS — I10 ESSENTIAL HYPERTENSION WITH GOAL BLOOD PRESSURE LESS THAN 140/90: ICD-10-CM

## 2018-08-14 DIAGNOSIS — G43.709 CHRONIC MIGRAINE WITHOUT AURA WITHOUT STATUS MIGRAINOSUS, NOT INTRACTABLE: ICD-10-CM

## 2018-08-14 RX ORDER — METOPROLOL SUCCINATE 25 MG/1
12.5 TABLET, EXTENDED RELEASE ORAL DAILY
Qty: 45 TABLET | Refills: 1 | Status: SHIPPED | OUTPATIENT
Start: 2018-08-14 | End: 2019-07-19

## 2018-08-14 RX ORDER — AMLODIPINE BESYLATE 10 MG/1
10 TABLET ORAL DAILY
Qty: 90 TABLET | Refills: 1 | Status: SHIPPED | OUTPATIENT
Start: 2018-08-14 | End: 2019-02-11

## 2018-08-14 RX ORDER — METOPROLOL SUCCINATE 25 MG/1
12.5 TABLET, EXTENDED RELEASE ORAL DAILY
Qty: 30 TABLET | Refills: 5 | Status: CANCELLED | OUTPATIENT
Start: 2018-08-14

## 2018-08-14 NOTE — TELEPHONE ENCOUNTER

## 2018-08-20 ENCOUNTER — MYC REFILL (OUTPATIENT)
Dept: FAMILY MEDICINE | Facility: CLINIC | Age: 43
End: 2018-08-20

## 2018-08-20 DIAGNOSIS — G43.719 INTRACTABLE CHRONIC MIGRAINE WITHOUT AURA AND WITHOUT STATUS MIGRAINOSUS: ICD-10-CM

## 2018-08-20 RX ORDER — RIZATRIPTAN BENZOATE 10 MG/1
5-10 TABLET ORAL
Qty: 18 TABLET | Refills: 11 | Status: SHIPPED | OUTPATIENT
Start: 2018-08-20 | End: 2019-09-04

## 2018-08-20 NOTE — TELEPHONE ENCOUNTER
Message from [employer]:  Original authorizing provider: MD Lopez Rajan would like a refill of the following medications:  rizatriptan (MAXALT) 10 MG tablet [Yogesh Medina MD]    Preferred pharmacy: [id number] [address] [address]    Comment:  I didn't realize I was out of fills on my Maxalt, Work as been horrendous lately and has been causing more headaches, but still not nearly as many as when I wasn't taking the metoprolol. Bill

## 2018-08-20 NOTE — TELEPHONE ENCOUNTER

## 2018-08-21 ENCOUNTER — TELEPHONE (OUTPATIENT)
Dept: FAMILY MEDICINE | Facility: CLINIC | Age: 43
End: 2018-08-21

## 2018-08-21 DIAGNOSIS — J30.1 CHRONIC ALLERGIC RHINITIS DUE TO POLLEN, UNSPECIFIED SEASONALITY: Primary | ICD-10-CM

## 2018-08-21 RX ORDER — AZELASTINE 1 MG/ML
1 SPRAY, METERED NASAL 2 TIMES DAILY
Qty: 1 BOTTLE | Refills: 11 | Status: SHIPPED | OUTPATIENT
Start: 2018-08-21 | End: 2019-09-04

## 2018-08-21 NOTE — TELEPHONE ENCOUNTER
"Request for medication refill: Azelastine HCL 0.1% SOLN 0.1, not on patient medication list    Providers if patient needs an appointment and you are willing to give a one month supply please refill for one month and  send a letter/MyChart using \".SMILLIMITEDREFILL\" .smillimited and route chart to \"P SMI \" (Giving one month refill in non controlled medications is strongly recommended before denial)    If refill has been denied, meaning absolutely no refills without visit, please complete the smart phrase \".smirxrefuse\" and route it to the \"P SMI MED REFILLS\"  pool to inform the patient and the pharmacy.    Bonita Rosenthal CMA        "

## 2019-02-11 DIAGNOSIS — I10 ESSENTIAL HYPERTENSION WITH GOAL BLOOD PRESSURE LESS THAN 140/90: ICD-10-CM

## 2019-02-11 RX ORDER — AMLODIPINE BESYLATE 10 MG/1
10 TABLET ORAL DAILY
Qty: 90 TABLET | Refills: 1 | Status: SHIPPED | OUTPATIENT
Start: 2019-02-11 | End: 2019-09-04

## 2019-02-11 NOTE — TELEPHONE ENCOUNTER

## 2019-07-18 DIAGNOSIS — I10 ESSENTIAL HYPERTENSION WITH GOAL BLOOD PRESSURE LESS THAN 140/90: ICD-10-CM

## 2019-07-18 DIAGNOSIS — G43.709 CHRONIC MIGRAINE WITHOUT AURA WITHOUT STATUS MIGRAINOSUS, NOT INTRACTABLE: ICD-10-CM

## 2019-07-18 NOTE — TELEPHONE ENCOUNTER
"Request for medication refill: Metoprolol Succinate ER 25MG    Providers if patient needs an appointment and you are willing to give a one month supply please refill for one month and  send a letter/MyChart using \".SMILLIMITEDREFILL\" .smillimited and route chart to \"P SMI \" (Giving one month refill in non controlled medications is strongly recommended before denial)    If refill has been denied, meaning absolutely no refills without visit, please complete the smart phrase \".smirxrefuse\" and route it to the \"P SMI MED REFILLS\"  pool to inform the patient and the pharmacy.    Dawn Geiger, [facility]        "

## 2019-07-19 RX ORDER — METOPROLOL SUCCINATE 25 MG/1
12.5 TABLET, EXTENDED RELEASE ORAL DAILY
Qty: 45 TABLET | Refills: 0 | Status: SHIPPED | OUTPATIENT
Start: 2019-07-19 | End: 2019-09-04

## 2019-09-04 ENCOUNTER — OFFICE VISIT (OUTPATIENT)
Dept: FAMILY MEDICINE | Facility: CLINIC | Age: 44
End: 2019-09-04
Payer: COMMERCIAL

## 2019-09-04 VITALS
DIASTOLIC BLOOD PRESSURE: 97 MMHG | BODY MASS INDEX: 32.94 KG/M2 | SYSTOLIC BLOOD PRESSURE: 152 MMHG | HEART RATE: 103 BPM | TEMPERATURE: 98.5 F | OXYGEN SATURATION: 96 % | RESPIRATION RATE: 16 BRPM | HEIGHT: 72 IN | WEIGHT: 243.2 LBS

## 2019-09-04 DIAGNOSIS — Z98.1 STATUS POST CERVICAL SPINAL FUSION: ICD-10-CM

## 2019-09-04 DIAGNOSIS — J01.00 ACUTE MAXILLARY SINUSITIS, RECURRENCE NOT SPECIFIED: ICD-10-CM

## 2019-09-04 DIAGNOSIS — Z00.00 ROUTINE GENERAL MEDICAL EXAMINATION AT A HEALTH CARE FACILITY: Primary | ICD-10-CM

## 2019-09-04 DIAGNOSIS — G43.719 INTRACTABLE CHRONIC MIGRAINE WITHOUT AURA AND WITHOUT STATUS MIGRAINOSUS: ICD-10-CM

## 2019-09-04 DIAGNOSIS — K21.00 GASTROESOPHAGEAL REFLUX DISEASE WITH ESOPHAGITIS: ICD-10-CM

## 2019-09-04 DIAGNOSIS — I10 ESSENTIAL HYPERTENSION WITH GOAL BLOOD PRESSURE LESS THAN 140/90: ICD-10-CM

## 2019-09-04 RX ORDER — RIZATRIPTAN BENZOATE 10 MG/1
5-10 TABLET ORAL
Qty: 18 TABLET | Refills: 11 | Status: SHIPPED | OUTPATIENT
Start: 2019-09-04

## 2019-09-04 RX ORDER — AMLODIPINE BESYLATE 10 MG/1
10 TABLET ORAL DAILY
Qty: 90 TABLET | Refills: 1 | Status: SHIPPED | OUTPATIENT
Start: 2019-09-04 | End: 2020-04-09

## 2019-09-04 RX ORDER — METOPROLOL SUCCINATE 25 MG/1
25 TABLET, EXTENDED RELEASE ORAL DAILY
Qty: 90 TABLET | Refills: 3 | Status: SHIPPED | OUTPATIENT
Start: 2019-09-04 | End: 2020-10-05

## 2019-09-04 ASSESSMENT — ANXIETY QUESTIONNAIRES
7. FEELING AFRAID AS IF SOMETHING AWFUL MIGHT HAPPEN: NOT AT ALL
GAD7 TOTAL SCORE: 1
3. WORRYING TOO MUCH ABOUT DIFFERENT THINGS: NOT AT ALL
IF YOU CHECKED OFF ANY PROBLEMS ON THIS QUESTIONNAIRE, HOW DIFFICULT HAVE THESE PROBLEMS MADE IT FOR YOU TO DO YOUR WORK, TAKE CARE OF THINGS AT HOME, OR GET ALONG WITH OTHER PEOPLE: NOT DIFFICULT AT ALL
6. BECOMING EASILY ANNOYED OR IRRITABLE: SEVERAL DAYS
2. NOT BEING ABLE TO STOP OR CONTROL WORRYING: NOT AT ALL
1. FEELING NERVOUS, ANXIOUS, OR ON EDGE: NOT AT ALL
5. BEING SO RESTLESS THAT IT IS HARD TO SIT STILL: NOT AT ALL

## 2019-09-04 ASSESSMENT — MIFFLIN-ST. JEOR: SCORE: 2026.15

## 2019-09-04 ASSESSMENT — PATIENT HEALTH QUESTIONNAIRE - PHQ9: 5. POOR APPETITE OR OVEREATING: NOT AT ALL

## 2019-09-04 NOTE — PATIENT INSTRUCTIONS
Here is the plan from today's visit    1. Routine general medical examination at a health care facility  Preventive Health Recommendations  Male Ages 40 to 49    Yearly exam:             See your health care provider every year in order to  o   Review health changes.   o   Discuss preventive care.    o   Review your medicines if your doctor has prescribed any.    You should be tested each year for STDs (sexually transmitted diseases) if you re at risk.     Have a cholesterol test every 5 years.     Have a colonoscopy (test for colon cancer) if someone in your family has had colon cancer or polyps before age 50.     After age 45, have a diabetes test (fasting glucose). If you are at risk for diabetes, you should have this test every 3 years.      Talk with your health care provider about whether or not a prostate cancer screening test (PSA) is right for you.    Shots: Get a flu shot each year. Get a tetanus shot every 10 years.     Nutrition:    Eat at least 5 servings of fruits and vegetables daily.     Eat whole-grain bread, whole-wheat pasta and brown rice instead of white grains and rice.     Get adequate Calcium and Vitamin D.     Lifestyle    Exercise for at least 150 minutes a week (30 minutes a day, 5 days a week). This will help you control your weight and prevent disease.     Limit alcohol to one drink per day.     No smoking.     Wear sunscreen to prevent skin cancer.     See your dentist every six months for an exam and cleaning.          2. Essential hypertension with goal blood pressure less than 140/90  Increase to the following:  - metoprolol succinate ER (TOPROL-XL) 25 MG 24 hr tablet; Take 1 tablet (25 mg) by mouth daily  Dispense: 90 tablet; Refill: 3  - amLODIPine (NORVASC) 10 MG tablet; Take 1 tablet (10 mg) by mouth daily  Dispense: 90 tablet; Refill: 1  - Lipid [place] ([facility])    3. Gastroesophageal reflux disease with esophagitis  - omeprazole (PRILOSEC) 20 MG DR capsule; Take 1 capsule (20  mg) by mouth daily  Dispense: 90 capsule; Refill: 3    4. Intractable chronic migraine without aura and without status migrainosus  - rizatriptan (MAXALT) 10 MG tablet; Take 0.5-1 tablets (5-10 mg) by mouth at onset of headache for migraine May repeat every 2 hours as needed.  Dispense: 18 tablet; Refill: 11  - metoprolol succinate ER (TOPROL-XL) 25 MG 24 hr tablet; Take 1 tablet (25 mg) by mouth daily  Dispense: 90 tablet; Refill: 3    5. Acute maxillary sinusitis, recurrence not specified  - amoxicillin-clavulanate (AUGMENTIN) 875-125 MG tablet; Take 1 tablet by mouth 2 times daily for 10 days  Dispense: 20 tablet; Refill: 0    7. Status post cervical spinal fusion  - tiZANidine (ZANAFLEX) 4 MG tablet; Take 1-2 tablets (4-8 mg) by mouth 3 times daily as needed for muscle spasms  Dispense: 90 tablet; Refill: 3    Follow up plan  2 weeks from Angeli juarez some BP readings

## 2019-09-04 NOTE — PROGRESS NOTES
Male Physical Note          HPI         Concerns today:   Acute Illness   Concerns:  Left sided sinus pain  When did it start? 3-4 weeks ago  Is it getting better, worse or staying the same? worse    Fatigue/Achiness?:No     Fever?: No     Chills/Sweats?: No     Headache (location?)No     Sinus Pressure?: YES - left maxiallary    Eye redness/Discharge?: No     Ear Pain?: No     Runny nose?: No     Congestion?:  YES - left sided    Sore Throat?: No   Respiratory    Cough?: no     Wheeze?: No   GI/    Decreased Appetite?: No     Nausea?:No     Vomiting?: No     Diarrhea?:  No     Dysuria/Frequency?.:No       Any Illness Exposure?: No     Any foreign travel or contact with anyone ill who travelled abroad? No     Therapies Tried and outcome: Nothing      Migraine Follow-Up    Headache symptoms:  Stable     Frequency:  3 times per month      Duration of headaches:  < 2 hours if treated    Able to do normal daily activities/work with headaches: Yes    Rescue/Relief medication:Maxalt              Effectiveness: total relief    Preventative medication: Toprol XL    Neurologic complications: No new stroke-like symptoms, loss of vision or speech, numbness or weakness    In the past 4 weeks, how often have you gone to Urgent Care or the emergency room because of your headaches?  0  ,   Hypertension Follow-up  <140/90    Outpatient blood pressures are not being checked.    Chest Pain? :No     Low Salt Diet: not monitoring salt    Daily NSAID Use?No     Did patient take their HTN pills today/last night as usual?  Yes    Patient Active Problem List   Diagnosis     Allergic rhinitis     Chronic maxillary sinusitis     Dermatophytosis of body     Hypertension goal BP (blood pressure) < 140/90     CARDIOVASCULAR SCREENING; LDL GOAL LESS THAN 160     Migraine headache     Hyperlipemia, mixed     Moderate major depression (H)     History of femur fracture     Gastroesophageal reflux disease with esophagitis     Compression  fracture of C-spine, sequela     Cervical stenosis of spine     Status post cervical spinal fusion     Major depressive disorder, recurrent episode, moderate (H)     Chronic pain due to trauma     Pain of left hand     Mechanical problems with limbs     Closed displaced fracture of third metacarpal bone of left hand with nonunion, unspecified portion of metacarpal, subsequent encounter       Past Medical History:   Diagnosis Date     ALLERGIC RHINITIS NOS 1/18/2007     CHR MAXILLARY SINUSITIS 1/18/2007    Chronic Recurrent     DERMATOPHYTOSIS OF BODY 9/20/2007    Chronic recurrent tinea versicolor     HTN (hypertension) 7/29/2010     Migraines      Other chronic pain     neck, between shoulder blades more on right side, right arm          Family History   Problem Relation Age of Onset     Breast Cancer Mother      C.A.D. Father      Hypertension Father      Heart Disease Father      Diabetes Maternal Grandfather      Asthma No family hx of      Cerebrovascular Disease No family hx of      Cancer - colorectal No family hx of      Prostate Cancer No family hx of               Review of Systems:     Review of Systems:  CONSTITUTIONAL: NEGATIVE for fever, chills, change in weight  INTEGUMENTARY/SKIN: NEGATIVE for worrisome rashes, moles or lesions  EYES: NEGATIVE for vision changes or irritation  ENT/MOUTH:  As above   RESP: NEGATIVE for significant cough or SOB  CV: NEGATIVE for chest pain, palpitations or peripheral edema  GI: NEGATIVE for nausea, abdominal pain, heartburn, or change in bowel habits  : NEGATIVE for frequency, dysuria, or hematuria   male : occasional ED  MUSCULOSKELETAL: NEGATIVE for significant arthralgias or myalgia  NEURO: NEGATIVE for weakness, dizziness or paresthesias  ENDOCRINE: NEGATIVE for temperature intolerance, skin/hair changes  HEME/ALLERGY: NEGATIVE for bleeding problems  PSYCHIATRIC: NEGATIVE for changes in mood or affect         Social History     Social History     Socioeconomic  History     Marital status:      Spouse name: Not on file     Number of children: 2     Years of education: Not on file     Highest education level: Not on file   Occupational History     Occupation: computer tech     Employer: [employer]     Comment: Computer work   Social Needs     Financial resource strain: Not on file     Food insecurity:     Worry: Not on file     Inability: Not on file     Transportation needs:     Medical: Not on file     Non-medical: Not on file   Tobacco Use     Smoking status: Never Smoker     Smokeless tobacco: Never Used   Substance and Sexual Activity     Alcohol use: Yes     Alcohol/week: 0.0 oz     Comment: social     Drug use: No     Sexual activity: Yes     Partners: Female     Birth control/protection: Surgical, None   Lifestyle     Physical activity:     Days per week: Not on file     Minutes per session: Not on file     Stress: Not on file   Relationships     Social connections:     Talks on phone: Not on file     Gets together: Not on file     Attends Denominational service: Not on file     Active member of club or organization: Not on file     Attends meetings of clubs or organizations: Not on file     Relationship status: Not on file     Intimate partner violence:     Fear of current or ex partner: Not on file     Emotionally abused: Not on file     Physically abused: Not on file     Forced sexual activity: Not on file   Other Topics Concern      Service No     Blood Transfusions No     Caffeine Concern No     Occupational Exposure No     Hobby Hazards No     Sleep Concern Yes     Comment: snoring, stops breathing at times     Stress Concern No     Weight Concern No     Special Diet No     Back Care Yes     Comment: C6 C7 fx     Exercise Yes     Comment: 1 time per week     Bike Helmet Yes     Seat Belt Yes     Self-Exams Yes     Parent/sibling w/ CABG, MI or angioplasty before 65F 55M? Yes     Comment: Father   Social History Narrative     Not on file             Physical Exam:     Vitals: BP (!) 152/97   Pulse 103   Temp 98.5  F (36.9  C) (Oral)   Resp 16   Ht 1.829 m (6')   Wt 110.3 kg (243 lb 3.2 oz)   SpO2 96%   BMI 32.98 kg/m    BMI= Body mass index is 32.98 kg/m .  GENERAL: alert, no distress and over weight  EYES: Eyes grossly normal to inspection, extraocular movements - intact, and PERRL  HENT: TM's pearly grey, normal light reflex bilateral and maxillary sinus tenderness left  NECK: no tenderness, no adenopathy, no asymmetry, no masses, no stiffness; thyroid- normal to palpation  RESP: lungs clear to auscultation - no rales, no rhonchi, no wheezes  CV: regular rates and rhythm, normal S1 S2, no S3 or S4 and no murmur, no click or rub -  ABDOMEN: soft, no tenderness, no  hepatosplenomegaly, no masses, normal bowel sounds  MS: extremities- no gross deformities noted, no edema  SKIN: no suspicious lesions, no rashes  NEURO: strength and tone- normal, sensory exam- grossly normal, mentation- intact, speech- normal, reflexes- symmetric  BACK: no CVA tenderness, no paralumbar tenderness  - male: testicles- normal, no atrophy, no masses;  no inguinal hernias  PSYCH: Alert and oriented times 3; speech- coherent , normal rate and volume; able to articulate logical thoughts, able to abstract reason, no tangential thoughts, no hallucinations or delusions, affect- normal  LYMPHATICS: ant. cervical- normal, post. cervical- normal, axillary- normal, supraclavicular- normal, inguinal- normal    Assessment and Plan      Ramesh was seen today for physical.    Diagnoses and all orders for this visit:    Routine general medical examination at a health care facility  Check the following labs.  Remainder of HM up to date.  Discussed exercise with patient   -     Lipid [place] ([facility])  -     Comprehensive Metabolic Panel ([facility])    Acute maxillary sinusitis, recurrence not specified  -     amoxicillin-clavulanate (AUGMENTIN) 875-125 MG tablet; Take 1 tablet by  mouth 2 times daily for 10 days    Essential hypertension with goal blood pressure less than 140/90  -     INCREASE to metoprolol succinate ER (TOPROL-XL) 25 MG 24 hr tablet; Take 1 tablet (25 mg) by mouth daily.  MyChart BP readings starting in approximately 2 weeks.  Consider increasing if needed.  -     amLODIPine (NORVASC) 10 MG tablet; Take 1 tablet (10 mg) by mouth daily  -     Lipid [place] ([facility])  -     CMP    Intractable chronic migraine without aura and without status migrainosus  -     rizatriptan (MAXALT) 10 MG tablet; Take 0.5-1 tablets (5-10 mg) by mouth at onset of headache for migraine May repeat every 2 hours as needed.  -     metoprolol succinate ER (TOPROL-XL) 25 MG 24 hr tablet; Take 1 tablet (25 mg) by mouth daily    Prior to leaving, patient requests the following refills     Gastroesophageal reflux disease with esophagitis  -     omeprazole (PRILOSEC) 20 MG DR capsule; Take 1 capsule (20 mg) by mouth daily    Status post cervical spinal fusion  -     tiZANidine (ZANAFLEX) 4 MG tablet; Take 1-2 tablets (4-8 mg) by mouth 3 times daily as needed for muscle spasms      Options for treatment and follow-up care were reviewed with the patient. Ramesh Galvez and/or guardian engaged in the decision making process and verbalized understanding of the options discussed and agreed with the final plan.    Yogesh Medina MD

## 2019-09-05 LAB
ALBUMIN SERPL-MCNC: 5.1 MG/DL (ref 3.8–5)
ALP SERPL-CCNC: 53.5 U/L (ref 31.7–110.7)
ALT SERPL-CCNC: 71.4 U/L (ref 0–45)
AST SERPL-CCNC: 38 U/L (ref 0–55)
BILIRUB SERPL-MCNC: 0.5 MG/DL (ref 0.2–1.3)
BUN SERPL-MCNC: 14.7 MG/DL (ref 7–21)
CALCIUM SERPL-MCNC: 9.8 MG/DL (ref 8.5–10.1)
CHLORIDE SERPLBLD-SCNC: 100.7 MMOL/L (ref 98–110)
CHOLEST SERPL-MCNC: 229.2 MG/DL (ref 0–200)
CHOLEST/HDLC SERPL: 5.2 {RATIO} (ref 0–5)
CO2 SERPL-SCNC: 31.9 MMOL/L (ref 20–32)
CREAT SERPL-MCNC: 1.1 MG/DL (ref 0.7–1.3)
GFR SERPL CREATININE-BSD FRML MDRD: 76.9 ML/MIN/1.7 M2
GLUCOSE SERPL-MCNC: 115.3 MG'DL (ref 70–99)
HDLC SERPL-MCNC: 44.2 MG/DL
LDLC SERPL CALC-MCNC: 105 MG/DL (ref 0–129)
POTASSIUM SERPL-SCNC: 3.6 MMOL/DL (ref 3.3–4.5)
PROT SERPL-MCNC: 7.6 G/DL (ref 6.8–8.8)
SODIUM SERPL-SCNC: 140.1 MMOL/L (ref 132.6–141.4)
TRIGL SERPL-MCNC: 398.3 MG/DL (ref 0–150)
VLDL CHOLESTEROL: 79.7 MG/DL (ref 7–32)

## 2019-09-06 ASSESSMENT — PATIENT HEALTH QUESTIONNAIRE - PHQ9: SUM OF ALL RESPONSES TO PHQ QUESTIONS 1-9: 6

## 2019-09-07 ASSESSMENT — ANXIETY QUESTIONNAIRES: GAD7 TOTAL SCORE: 1

## 2019-11-04 ENCOUNTER — HEALTH MAINTENANCE LETTER (OUTPATIENT)
Age: 44
End: 2019-11-04

## 2020-02-11 ENCOUNTER — ANCILLARY PROCEDURE (OUTPATIENT)
Dept: GENERAL RADIOLOGY | Facility: CLINIC | Age: 45
End: 2020-02-11
Attending: NURSE PRACTITIONER
Payer: COMMERCIAL

## 2020-02-11 ENCOUNTER — OFFICE VISIT (OUTPATIENT)
Dept: FAMILY MEDICINE | Facility: CLINIC | Age: 45
End: 2020-02-11
Payer: COMMERCIAL

## 2020-02-11 VITALS
BODY MASS INDEX: 33.92 KG/M2 | HEART RATE: 90 BPM | TEMPERATURE: 97.3 F | DIASTOLIC BLOOD PRESSURE: 85 MMHG | OXYGEN SATURATION: 96 % | SYSTOLIC BLOOD PRESSURE: 139 MMHG | WEIGHT: 250.1 LBS

## 2020-02-11 DIAGNOSIS — T14.90XA TRAUMA: Primary | ICD-10-CM

## 2020-02-11 DIAGNOSIS — T14.90XA TRAUMA: ICD-10-CM

## 2020-02-11 PROCEDURE — 73130 X-RAY EXAM OF HAND: CPT | Mod: RT

## 2020-02-11 PROCEDURE — 99213 OFFICE O/P EST LOW 20 MIN: CPT | Performed by: NURSE PRACTITIONER

## 2020-02-11 NOTE — PROGRESS NOTES
Subjective     Ramesh Galvez is a 44 year old male who presents to clinic today for the following health issues:    HPI     Hand swelling and pain, injury happened on Sunday evening while shoveling. Patient fell and landed on right hand and has noticed swelling and pain since then. Swelling started at middle knuckle and was right away and has since progressed into the whole hand. Has noticed bruising from the index finger into the hand as well.    Patient Active Problem List   Diagnosis     Allergic rhinitis     Chronic maxillary sinusitis     Dermatophytosis of body     Hypertension goal BP (blood pressure) < 140/90     CARDIOVASCULAR SCREENING; LDL GOAL LESS THAN 160     Migraine headache     Hyperlipemia, mixed     Moderate major depression (H)     History of femur fracture     Gastroesophageal reflux disease with esophagitis     Compression fracture of C-spine, sequela     Cervical stenosis of spine     Status post cervical spinal fusion     Major depressive disorder, recurrent episode, moderate (H)     Chronic pain due to trauma     Pain of left hand     Mechanical problems with limbs     Closed displaced fracture of third metacarpal bone of left hand with nonunion, unspecified portion of metacarpal, subsequent encounter     Past Surgical History:   Procedure Laterality Date     C ARTHROPLASTY WRIST JT      R wrist graft from hip secondary to traumatic fracture     DISCECTOMY, FUSION CERVICAL ANTERIOR ONE LEVEL, COMBINED Right 3/22/2016    Procedure: COMBINED DISCECTOMY, FUSION CERVICAL ANTERIOR ONE LEVEL;  Surgeon: Fidencio Santacruz MD;  Location: [facility] OR     HC NASAL/SINUS SCOPE W THER INSTILL       HC OPEN TX CARPAL SCAPHOID NAVICULAR FRACTURE Right      HERNIA REPAIR, INGUINAL RT/LT  Age 9 years    Right     ORTHOPEDIC SURGERY      right femur surgery       Social History     Tobacco Use     Smoking status: Never Smoker     Smokeless tobacco: Never Used   Substance Use Topics     Alcohol use: Yes      Alcohol/week: 0.0 standard drinks     Comment: social     Family History   Problem Relation Age of Onset     Breast Cancer Mother      C.A.D. Father      Hypertension Father      Heart Disease Father      Kidney Disease Father         transplant     Pancreatitis Father      Diabetes Maternal Grandfather      Asthma No family hx of      Cerebrovascular Disease No family hx of      Cancer - colorectal No family hx of      Prostate Cancer No family hx of          Current Outpatient Medications   Medication Sig Dispense Refill     amLODIPine (NORVASC) 10 MG tablet Take 1 tablet (10 mg) by mouth daily 90 tablet 1     cetirizine (ZYRTEC) 10 MG tablet Take 10 mg by mouth daily       COENZYME Q-10 PO Take 100 mg by mouth 2 times daily       DAILY MULTIPLE VITAMINS TABS Take by mouth daily       EPINEPHrine (EPIPEN/ADRENACLICK/OR ANY BX GENERIC EQUIV) 0.3 MG/0.3ML injection 2-pack Inject 0.3 mLs (0.3 mg) into the muscle as needed for anaphylaxis 0.6 mL 1     metoprolol succinate ER (TOPROL-XL) 25 MG 24 hr tablet Take 1 tablet (25 mg) by mouth daily 90 tablet 3     omeprazole (PRILOSEC) 20 MG DR capsule Take 1 capsule (20 mg) by mouth daily 90 capsule 3     rizatriptan (MAXALT) 10 MG tablet Take 0.5-1 tablets (5-10 mg) by mouth at onset of headache for migraine May repeat every 2 hours as needed. 18 tablet 11     tiZANidine (ZANAFLEX) 4 MG tablet Take 1-2 tablets (4-8 mg) by mouth 3 times daily as needed for muscle spasms 90 tablet 3     Allergies   Allergen Reactions     Toradol Unknown     Wasps [Hornets] Hives, Itching and Swelling     Demerol Nausea and Vomiting     Sporanox [Itraconazole] Hives     Reviewed and updated as needed this visit by Provider         Review of Systems   ROS COMP: Constitutional, HEENT, cardiovascular, pulmonary, gi and gu systems are negative, except as otherwise noted.      Objective    Wt 113.4 kg (250 lb 1.6 oz)   BMI 33.92 kg/m    Body mass index is 33.92 kg/m .  Physical Exam   GENERAL:  healthy, alert and no distress  MS: he has some  Edema of dorsum of hand at base of index and long fingers to mid carpals, no limitation to ROM of wrist, or finger joints, strong , minimal tenderness with palpation   SKIN: no suspicious lesions or rashes, minimal ecchymosis    Diagnostic Test Results:                                                                   IMPRESSION: No acute bony abnormality. Status post fixation of a  healed appearing scaphoid fracture with an Acutrak type screw. Mild  soft tissue swelling in the dorsal hand.     BERTIN ZARAGOZA MD        Assessment & Plan       ICD-10-CM    1. Trauma T14.90XA XR Hand Right G/E 3 Views      reassured no fracture or dislocation    DIANE Huston [facility]

## 2020-02-11 NOTE — LETTER
[place] [address] [facility]  [address]  [place]  [address]  Tel: [phone]    February 13, 2020    Ramesh Galvez  [address]  [place] [address]        Dear Mr. Galvez,    The results of your recent xray is enclosed.   Resulted Orders   XR Hand Right G/E 3 Views    Narrative    RIGHT HAND THREE OR MORE VIEWS 2/11/2020 1:16 PM     HISTORY: Trauma.    COMPARISON: None.      Impression    IMPRESSION: No acute bony abnormality. Status post fixation of a  healed appearing scaphoid fracture with an Acutrak type screw. Mild  soft tissue swelling in the dorsal hand.    BERTIN ZARAGOZA MD         If you have any further questions or problems, please contact our office.      Sincerely,    Kenna Menendez NP / lorena

## 2020-03-30 ENCOUNTER — ANCILLARY PROCEDURE (OUTPATIENT)
Dept: GENERAL RADIOLOGY | Facility: CLINIC | Age: 45
End: 2020-03-30
Attending: PHYSICIAN ASSISTANT
Payer: COMMERCIAL

## 2020-03-30 ENCOUNTER — OFFICE VISIT (OUTPATIENT)
Dept: ORTHOPEDICS | Facility: CLINIC | Age: 45
End: 2020-03-30
Payer: COMMERCIAL

## 2020-03-30 VITALS — BODY MASS INDEX: 33.86 KG/M2 | HEIGHT: 72 IN | RESPIRATION RATE: 12 BRPM | WEIGHT: 250 LBS

## 2020-03-30 DIAGNOSIS — M79.672 LEFT FOOT PAIN: Primary | ICD-10-CM

## 2020-03-30 DIAGNOSIS — M79.672 LEFT FOOT PAIN: ICD-10-CM

## 2020-03-30 DIAGNOSIS — S90.32XA CONTUSION OF LEFT FOOT, INITIAL ENCOUNTER: ICD-10-CM

## 2020-03-30 PROCEDURE — 99203 OFFICE O/P NEW LOW 30 MIN: CPT | Performed by: ORTHOPAEDIC SURGERY

## 2020-03-30 PROCEDURE — 73630 X-RAY EXAM OF FOOT: CPT | Mod: LT

## 2020-03-30 ASSESSMENT — MIFFLIN-ST. JEOR: SCORE: 2051.99

## 2020-03-30 NOTE — PATIENT INSTRUCTIONS
Injury likely contusion.  Hopefully will resolve in a couple weeks.  If there is any crack, it is nondisplaced.  Avoid hopping, jumping, being on tiptoe until pain is gone.

## 2020-03-30 NOTE — LETTER
3/30/2020         RE: Ramesh Galvez  [address]  [place] [address]        Dear Colleague,    Thank you for referring your patient, Ramesh Galvez, to the [facility]. Please see a copy of my visit note below.    Ramesh Galvez is a 45 year old male who is seen as self referral for left foot injury.  A tree had fallen down at his parents house on Saturday.  He was cutting apart the tree with a chainsaw when he dropped a 6 to 8 foot log on his foot.  He had significant pain in the dorsal aspect of the left foot initially.  He could function and walk, however.  They are also expecting a child at home, and are in the midst of a remodeling project.  He was working on this Sunday and was on his feet much of the time.  It was sore but tolerable.  Late in the evening he bumped the foot and had significant pain over the top of the foot.  He felt like something had slipped.  He now has sharp, aching, episodic pain rated between 3 and 6 out of 10.  Pain is worse with pressure, dependence, walking.  It is better if he elevates, ice, has no shoe on.  He normally works in information technology.    X-ray of the left foot today shows no definite fractures.    Past Medical History:   Diagnosis Date     ALLERGIC RHINITIS NOS 1/18/2007     CHR MAXILLARY SINUSITIS 1/18/2007    Chronic Recurrent     DERMATOPHYTOSIS OF BODY 9/20/2007    Chronic recurrent tinea versicolor     HTN (hypertension) 7/29/2010     Migraines      Other chronic pain     neck, between shoulder blades more on right side, right arm       Past Surgical History:   Procedure Laterality Date     C ARTHROPLASTY WRIST JT      R wrist graft from hip secondary to traumatic fracture     DISCECTOMY, FUSION CERVICAL ANTERIOR ONE LEVEL, COMBINED Right 3/22/2016    Procedure: COMBINED DISCECTOMY, FUSION CERVICAL ANTERIOR ONE LEVEL;  Surgeon: Fidencio Santacruz MD;  Location: [facility] OR      NASAL/SINUS SCOPE W THER INSTILL         OPEN TX CARPAL SCAPHOID NAVICULAR FRACTURE Right      HERNIA REPAIR, INGUINAL RT/LT  Age 9 years    Right     ORTHOPEDIC SURGERY      right femur surgery       Family History   Problem Relation Age of Onset     Breast Cancer Mother      C.A.D. Father      Hypertension Father      Heart Disease Father      Kidney Disease Father         transplant     Pancreatitis Father      Diabetes Maternal Grandfather      Asthma No family hx of      Cerebrovascular Disease No family hx of      Cancer - colorectal No family hx of      Prostate Cancer No family hx of        Social History     Socioeconomic History     Marital status:      Spouse name: Not on file     Number of children: 2     Years of education: Not on file     Highest education level: Not on file   Occupational History     Occupation: computer tech     Employer: [employer]     Comment: [employer] work   Social Needs     Financial resource strain: Not on file     Food insecurity     Worry: Not on file     Inability: Not on file     Transportation needs     Medical: Not on file     Non-medical: Not on file   Tobacco Use     Smoking status: Never Smoker     Smokeless tobacco: Never Used   Substance and Sexual Activity     Alcohol use: Yes     Alcohol/week: 0.0 standard drinks     Comment: social     Drug use: No     Sexual activity: Yes     Partners: Female     Birth control/protection: Surgical, None   Lifestyle     Physical activity     Days per week: Not on file     Minutes per session: Not on file     Stress: Not on file   Relationships     Social connections     Talks on phone: Not on file     Gets together: Not on file     Attends Mormon service: Not on file     Active member of club or organization: Not on file     Attends meetings of clubs or organizations: Not on file     Relationship status: Not on file     Intimate partner violence     Fear of current or ex partner: Not on file     Emotionally abused: Not on file     Physically abused:  Not on file     Forced sexual activity: Not on file   Other Topics Concern      Service No     Blood Transfusions No     Caffeine Concern No     Occupational Exposure No     Hobby Hazards No     Sleep Concern Yes     Comment: snoring, stops breathing at times     Stress Concern No     Weight Concern No     Special Diet No     Back Care Yes     Comment: C6 C7 fx     Exercise Yes     Comment: 1 time per week     Bike Helmet Yes     Seat Belt Yes     Self-Exams Yes     Parent/sibling w/ CABG, MI or angioplasty before 65F 55M? Yes     Comment: Father   Social History Narrative     Not on file       Current Outpatient Medications   Medication Sig Dispense Refill     amLODIPine (NORVASC) 10 MG tablet Take 1 tablet (10 mg) by mouth daily 90 tablet 1     cetirizine (ZYRTEC) 10 MG tablet Take 10 mg by mouth daily       COENZYME Q-10 PO Take 100 mg by mouth 2 times daily       DAILY MULTIPLE VITAMINS TABS Take by mouth daily       EPINEPHrine (EPIPEN/ADRENACLICK/OR ANY BX GENERIC EQUIV) 0.3 MG/0.3ML injection 2-pack Inject 0.3 mLs (0.3 mg) into the muscle as needed for anaphylaxis 0.6 mL 1     metoprolol succinate ER (TOPROL-XL) 25 MG 24 hr tablet Take 1 tablet (25 mg) by mouth daily 90 tablet 3     omeprazole (PRILOSEC) 20 MG DR capsule Take 1 capsule (20 mg) by mouth daily 90 capsule 3     rizatriptan (MAXALT) 10 MG tablet Take 0.5-1 tablets (5-10 mg) by mouth at onset of headache for migraine May repeat every 2 hours as needed. 18 tablet 11     tiZANidine (ZANAFLEX) 4 MG tablet Take 1-2 tablets (4-8 mg) by mouth 3 times daily as needed for muscle spasms 90 tablet 3       Allergies   Allergen Reactions     Toradol Unknown     Wasps [Hornets] Hives, Itching and Swelling     Demerol Nausea and Vomiting     Sporanox [Itraconazole] Hives       REVIEW OF SYSTEMS:  CONSTITUTIONAL:  NEGATIVE for fever, chills, change in weight, not feeling tired  SKIN:  NEGATIVE for worrisome rashes, no skin lumps, no skin ulcers and no  non-healing wounds  EYES:  NEGATIVE for vision changes or irritation.  ENT/MOUTH:  NEGATIVE.  No hearing loss, no hoarseness, no difficulty swallowing.  RESP:  NEGATIVE. No cough or shortness of breath.  CV:  NEGATIVE for chest pain, palpitations or peripheral edema  GI:  NEGATIVE for nausea, abdominal pain, heartburn, or change in bowel habits  :  Negative. No dysuria, no hematuria  MUSCULOSKELETAL:  See HPI above  NEURO:  NEGATIVE . No headaches, no dizziness,  no numbness  ENDOCRINE:  NEGATIVE for temperature intolerance, skin/hair changes  HEME/ALLERGY/IMMUNE:  NEGATIVE for bleeding problems  PSYCHIATRIC:  NEGATIVE. no anxiety, no depression.     Exam:  Vitals: Resp 12   Ht 1.829 m (6')   Wt 113.4 kg (250 lb)   BMI 33.91 kg/m    BMI= Body mass index is 33.91 kg/m .  Constitutional:  healthy, alert and no distress  Neuro: Alert and Oriented x 3, Sensation grossly WNL.  Psych: Affect normal   Respiratory: Breathing not labored.  Cardiovascular: normal peripheral pulses  Lymph: no adenopathy  Skin: No rashes,worrisome lesions or skin problems  He has considerable swelling and bruising over the dorsal aspect of the foot down to the toes.  He had no tenderness or bruising at the medial or lateral malleolus.  He had no pain over the medial or lateral aspect of the foot is over the first or fifth metatarsal.  Most tenderness is over the 2nd-4th metatarsals.  He has mild pain with pressure under the third metatarsal head.  No pain with pressure under the first, second, fourth, or fifth metatarsal heads.   With longitudinal compression through the metatarsals he has mild pain with compression on the second and third metatarsals, but no other bones.  Sensation, motor and circulation are intact.    Assessment: He certainly has a contusion to the top of the foot with swelling and bruising.  I would expect a fracture to have pain with manipulation of the bone and a more remote spot such as under the metatarsal heads.   He really had minimal pain with that.  The x-ray shows no definite fractures and if there is anything present it would be totally nondisplaced.  Plan: I recommend he avoid hopping, jumping, getting up on tiptoes until he is pain-free.  He then may resume activities as tolerated.  If this is a contusion I would expect most pain to be gone in about 2 weeks.    Again, thank you for allowing me to participate in the care of your patient.        Sincerely,        Reg León MD

## 2020-03-30 NOTE — PROGRESS NOTES
Ramesh Galvez is a 45 year old male who is seen as self referral for left foot injury.  A tree had fallen down at his parents house on Saturday.  He was cutting apart the tree with a chainsaw when he dropped a 6 to 8 foot log on his foot.  He had significant pain in the dorsal aspect of the left foot initially.  He could function and walk, however.  They are also expecting a child at home, and are in the midst of a remodeling project.  He was working on this Sunday and was on his feet much of the time.  It was sore but tolerable.  Late in the evening he bumped the foot and had significant pain over the top of the foot.  He felt like something had slipped.  He now has sharp, aching, episodic pain rated between 3 and 6 out of 10.  Pain is worse with pressure, dependence, walking.  It is better if he elevates, ice, has no shoe on.  He normally works in information technology.    X-ray of the left foot today shows no definite fractures.    Past Medical History:   Diagnosis Date     ALLERGIC RHINITIS NOS 1/18/2007     CHR MAXILLARY SINUSITIS 1/18/2007    Chronic Recurrent     DERMATOPHYTOSIS OF BODY 9/20/2007    Chronic recurrent tinea versicolor     HTN (hypertension) 7/29/2010     Migraines      Other chronic pain     neck, between shoulder blades more on right side, right arm       Past Surgical History:   Procedure Laterality Date     C ARTHROPLASTY WRIST JT      R wrist graft from hip secondary to traumatic fracture     DISCECTOMY, FUSION CERVICAL ANTERIOR ONE LEVEL, COMBINED Right 3/22/2016    Procedure: COMBINED DISCECTOMY, FUSION CERVICAL ANTERIOR ONE LEVEL;  Surgeon: Fidencio Santacruz MD;  Location: [facility] OR     HC NASAL/SINUS SCOPE W THER INSTILL       HC OPEN TX CARPAL SCAPHOID NAVICULAR FRACTURE Right      HERNIA REPAIR, INGUINAL RT/LT  Age 9 years    Right     ORTHOPEDIC SURGERY      right femur surgery       Family History   Problem Relation Age of Onset     Breast Cancer Mother      C.A.D. Father       Hypertension Father      Heart Disease Father      Kidney Disease Father         transplant     Pancreatitis Father      Diabetes Maternal Grandfather      Asthma No family hx of      Cerebrovascular Disease No family hx of      Cancer - colorectal No family hx of      Prostate Cancer No family hx of        Social History     Socioeconomic History     Marital status:      Spouse name: Not on file     Number of children: 2     Years of education: Not on file     Highest education level: Not on file   Occupational History     Occupation: computer tech     Employer: [employer]     Comment: Computer work   Social Needs     Financial resource strain: Not on file     Food insecurity     Worry: Not on file     Inability: Not on file     Transportation needs     Medical: Not on file     Non-medical: Not on file   Tobacco Use     Smoking status: Never Smoker     Smokeless tobacco: Never Used   Substance and Sexual Activity     Alcohol use: Yes     Alcohol/week: 0.0 standard drinks     Comment: social     Drug use: No     Sexual activity: Yes     Partners: Female     Birth control/protection: Surgical, None   Lifestyle     Physical activity     Days per week: Not on file     Minutes per session: Not on file     Stress: Not on file   Relationships     Social connections     Talks on phone: Not on file     Gets together: Not on file     Attends Zoroastrianism service: Not on file     Active member of club or organization: Not on file     Attends meetings of clubs or organizations: Not on file     Relationship status: Not on file     Intimate partner violence     Fear of current or ex partner: Not on file     Emotionally abused: Not on file     Physically abused: Not on file     Forced sexual activity: Not on file   Other Topics Concern      Service No     Blood Transfusions No     Caffeine Concern No     Occupational Exposure No     Hobby Hazards No     Sleep Concern Yes     Comment: snoring, stops  breathing at times     Stress Concern No     Weight Concern No     Special Diet No     Back Care Yes     Comment: C6 C7 fx     Exercise Yes     Comment: 1 time per week     Bike Helmet Yes     Seat Belt Yes     Self-Exams Yes     Parent/sibling w/ CABG, MI or angioplasty before 65F 55M? Yes     Comment: Father   Social History Narrative     Not on file       Current Outpatient Medications   Medication Sig Dispense Refill     amLODIPine (NORVASC) 10 MG tablet Take 1 tablet (10 mg) by mouth daily 90 tablet 1     cetirizine (ZYRTEC) 10 MG tablet Take 10 mg by mouth daily       COENZYME Q-10 PO Take 100 mg by mouth 2 times daily       DAILY MULTIPLE VITAMINS TABS Take by mouth daily       EPINEPHrine (EPIPEN/ADRENACLICK/OR ANY BX GENERIC EQUIV) 0.3 MG/0.3ML injection 2-pack Inject 0.3 mLs (0.3 mg) into the muscle as needed for anaphylaxis 0.6 mL 1     metoprolol succinate ER (TOPROL-XL) 25 MG 24 hr tablet Take 1 tablet (25 mg) by mouth daily 90 tablet 3     omeprazole (PRILOSEC) 20 MG DR capsule Take 1 capsule (20 mg) by mouth daily 90 capsule 3     rizatriptan (MAXALT) 10 MG tablet Take 0.5-1 tablets (5-10 mg) by mouth at onset of headache for migraine May repeat every 2 hours as needed. 18 tablet 11     tiZANidine (ZANAFLEX) 4 MG tablet Take 1-2 tablets (4-8 mg) by mouth 3 times daily as needed for muscle spasms 90 tablet 3       Allergies   Allergen Reactions     Toradol Unknown     Wasps [Hornets] Hives, Itching and Swelling     Demerol Nausea and Vomiting     Sporanox [Itraconazole] Hives       REVIEW OF SYSTEMS:  CONSTITUTIONAL:  NEGATIVE for fever, chills, change in weight, not feeling tired  SKIN:  NEGATIVE for worrisome rashes, no skin lumps, no skin ulcers and no non-healing wounds  EYES:  NEGATIVE for vision changes or irritation.  ENT/MOUTH:  NEGATIVE.  No hearing loss, no hoarseness, no difficulty swallowing.  RESP:  NEGATIVE. No cough or shortness of breath.  CV:  NEGATIVE for chest pain, palpitations or  peripheral edema  GI:  NEGATIVE for nausea, abdominal pain, heartburn, or change in bowel habits  :  Negative. No dysuria, no hematuria  MUSCULOSKELETAL:  See HPI above  NEURO:  NEGATIVE . No headaches, no dizziness,  no numbness  ENDOCRINE:  NEGATIVE for temperature intolerance, skin/hair changes  HEME/ALLERGY/IMMUNE:  NEGATIVE for bleeding problems  PSYCHIATRIC:  NEGATIVE. no anxiety, no depression.     Exam:  Vitals: Resp 12   Ht 1.829 m (6')   Wt 113.4 kg (250 lb)   BMI 33.91 kg/m    BMI= Body mass index is 33.91 kg/m .  Constitutional:  healthy, alert and no distress  Neuro: Alert and Oriented x 3, Sensation grossly WNL.  Psych: Affect normal   Respiratory: Breathing not labored.  Cardiovascular: normal peripheral pulses  Lymph: no adenopathy  Skin: No rashes,worrisome lesions or skin problems  He has considerable swelling and bruising over the dorsal aspect of the foot down to the toes.  He had no tenderness or bruising at the medial or lateral malleolus.  He had no pain over the medial or lateral aspect of the foot is over the first or fifth metatarsal.  Most tenderness is over the 2nd-4th metatarsals.  He has mild pain with pressure under the third metatarsal head.  No pain with pressure under the first, second, fourth, or fifth metatarsal heads.   With longitudinal compression through the metatarsals he has mild pain with compression on the second and third metatarsals, but no other bones.  Sensation, motor and circulation are intact.    Assessment: He certainly has a contusion to the top of the foot with swelling and bruising.  I would expect a fracture to have pain with manipulation of the bone and a more remote spot such as under the metatarsal heads.  He really had minimal pain with that.  The x-ray shows no definite fractures and if there is anything present it would be totally nondisplaced.  Plan: I recommend he avoid hopping, jumping, getting up on tiptoes until he is pain-free.  He then may  resume activities as tolerated.  If this is a contusion I would expect most pain to be gone in about 2 weeks.

## 2020-04-09 DIAGNOSIS — I10 ESSENTIAL HYPERTENSION WITH GOAL BLOOD PRESSURE LESS THAN 140/90: ICD-10-CM

## 2020-04-09 RX ORDER — AMLODIPINE BESYLATE 10 MG/1
10 TABLET ORAL DAILY
Qty: 90 TABLET | Refills: 1 | Status: SHIPPED | OUTPATIENT
Start: 2020-04-09

## 2020-04-09 NOTE — TELEPHONE ENCOUNTER
"Request for medication refill:    Providers if patient needs an appointment and you are willing to give a one month supply please refill for one month and  send a letter/MyChart using \".SMILLIMITEDREFILL\" .smillimited and route chart to \"P SMI \" (Giving one month refill in non controlled medications is strongly recommended before denial)    If refill has been denied, meaning absolutely no refills without visit, please complete the smart phrase \".smirxrefuse\" and route it to the \"P SMI MED REFILLS\"  pool to inform the patient and the pharmacy.    Thania Kirby MA        " ----- Message from Gordo Ibarra sent at 3/16/2020  3:15 PM CDT -----  Contact: pt  Type:  Patient Returning Call    Who Called:  pt  Who Left Message for Patient:  Not sure  Does the patient know what this is regarding?:  Her procedure on 03/19/2020  Best Call Back Number:  [phone]  Additional Information:

## 2020-07-27 DIAGNOSIS — K21.00 GASTROESOPHAGEAL REFLUX DISEASE WITH ESOPHAGITIS: ICD-10-CM

## 2020-07-27 NOTE — TELEPHONE ENCOUNTER

## 2020-10-05 DIAGNOSIS — I10 ESSENTIAL HYPERTENSION WITH GOAL BLOOD PRESSURE LESS THAN 140/90: ICD-10-CM

## 2020-10-05 DIAGNOSIS — G43.719 INTRACTABLE CHRONIC MIGRAINE WITHOUT AURA AND WITHOUT STATUS MIGRAINOSUS: ICD-10-CM

## 2020-10-05 RX ORDER — METOPROLOL SUCCINATE 25 MG/1
25 TABLET, EXTENDED RELEASE ORAL DAILY
Qty: 90 TABLET | Refills: 3 | Status: SHIPPED | OUTPATIENT
Start: 2020-10-05

## 2020-10-05 NOTE — TELEPHONE ENCOUNTER
"Request for medication refill:  metoprolol succinate ER (TOPROL-XL) 25 MG 24 hr tablet    Providers if patient needs an appointment and you are willing to give a one month supply please refill for one month and  send a letter/MyChart using \".SMILLIMITEDREFILL\" .smillimited and route chart to \"P SMI \" (Giving one month refill in non controlled medications is strongly recommended before denial)    If refill has been denied, meaning absolutely no refills without visit, please complete the smart phrase \".smirxrefuse\" and route it to the \"P SMI MED REFILLS\"  pool to inform the patient and the pharmacy.    Paige Story MA        "

## 2020-11-16 ENCOUNTER — HEALTH MAINTENANCE LETTER (OUTPATIENT)
Age: 45
End: 2020-11-16

## 2021-04-23 ENCOUNTER — IMMUNIZATION (OUTPATIENT)
Dept: NURSING | Facility: CLINIC | Age: 46
End: 2021-04-23
Payer: COMMERCIAL

## 2021-04-23 PROCEDURE — 0001A PR COVID VAC PFIZER DIL RECON 30 MCG/0.3 ML IM: CPT

## 2021-04-23 PROCEDURE — 91300 PR COVID VAC PFIZER DIL RECON 30 MCG/0.3 ML IM: CPT

## 2021-05-14 ENCOUNTER — IMMUNIZATION (OUTPATIENT)
Dept: NURSING | Facility: CLINIC | Age: 46
End: 2021-05-14
Attending: INTERNAL MEDICINE
Payer: COMMERCIAL

## 2021-05-14 PROCEDURE — 91300 PR COVID VAC PFIZER DIL RECON 30 MCG/0.3 ML IM: CPT

## 2021-05-14 PROCEDURE — 0002A PR COVID VAC PFIZER DIL RECON 30 MCG/0.3 ML IM: CPT

## 2021-09-18 ENCOUNTER — HEALTH MAINTENANCE LETTER (OUTPATIENT)
Age: 46
End: 2021-09-18

## 2021-10-28 ENCOUNTER — OFFICE VISIT (OUTPATIENT)
Dept: URGENT CARE | Facility: URGENT CARE | Age: 46
End: 2021-10-28
Payer: COMMERCIAL

## 2021-10-28 VITALS
BODY MASS INDEX: 33.09 KG/M2 | RESPIRATION RATE: 16 BRPM | TEMPERATURE: 97.3 F | DIASTOLIC BLOOD PRESSURE: 91 MMHG | HEART RATE: 114 BPM | WEIGHT: 244 LBS | OXYGEN SATURATION: 98 % | SYSTOLIC BLOOD PRESSURE: 142 MMHG
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
## 2021-10-28 DIAGNOSIS — S61.258A CAT BITE OF INDEX FINGER, INITIAL ENCOUNTER: Primary | ICD-10-CM

## 2021-10-28 PROCEDURE — 90471 IMMUNIZATION ADMIN: CPT | Performed by: PHYSICIAN ASSISTANT

## 2021-10-28 PROCEDURE — 99213 OFFICE O/P EST LOW 20 MIN: CPT | Mod: 25 | Performed by: PHYSICIAN ASSISTANT

## 2021-10-28 PROCEDURE — 90715 TDAP VACCINE 7 YRS/> IM: CPT | Performed by: PHYSICIAN ASSISTANT

## 2021-10-28 RX ORDER — MULTIVITAMIN WITH IRON
1 TABLET ORAL
COMMUNITY

## 2021-10-28 RX ORDER — LISINOPRIL 10 MG/1
10 TABLET ORAL
COMMUNITY
Start: 2020-10-07

## 2021-10-28 ASSESSMENT — ENCOUNTER SYMPTOMS
NECK STIFFNESS: 0
WHEEZING: 0
WOUND: 1
NECK PAIN: 0
BACK PAIN: 0
CHILLS: 0
COUGH: 0
FEVER: 0
FATIGUE: 0
CHEST TIGHTNESS: 0
PALPITATIONS: 0
MYALGIAS: 1
COLOR CHANGE: 1
SHORTNESS OF BREATH: 0
ARTHRALGIAS: 1
CONSTITUTIONAL NEGATIVE: 1
JOINT SWELLING: 0

## 2021-10-28 NOTE — PROGRESS NOTES
Panda Marroquin is a 46 year old who presents for the following health issues   HPI   Concern - cat bite  Onset: last night  Description:  Sustained a cat bite to his R index finger after trying to separate two cats.  Finger is now tender, red and swollen.  No drainage or fevers.  Last Tdap was 2012.  Cat is UTD on his vaccinations per patient as he is a .    Intensity: moderate  Progression of Symptoms:  same  Accompanying Signs & Symptoms:  No radicular pain, numbness, tingling or weakness.  R hand dominant.  Previous history of similar problem: none  Precipitating factors:        Worsened by: none  Alleviating factors:        Improved by: none  Therapies tried and outcome: water soaks with some relief    Patient Active Problem List   Diagnosis     Allergic rhinitis     Chronic maxillary sinusitis     Dermatophytosis of body     Hypertension goal BP (blood pressure) < 140/90     CARDIOVASCULAR SCREENING; LDL GOAL LESS THAN 160     Migraine headache     Hyperlipemia, mixed     Moderate major depression (H)     History of femur fracture     Gastroesophageal reflux disease with esophagitis     Compression fracture of C-spine, sequela     Cervical stenosis of spine     Status post cervical spinal fusion     Major depressive disorder, recurrent episode, moderate (H)     Chronic pain due to trauma     Pain of left hand     Mechanical problems with limbs     Closed displaced fracture of third metacarpal bone of left hand with nonunion, unspecified portion of metacarpal, subsequent encounter     Current Outpatient Medications   Medication     amLODIPine (NORVASC) 10 MG tablet     cetirizine (ZYRTEC) 10 MG tablet     cholecalciferol 25 MCG (1000 UT) TABS     COENZYME Q-10 PO     DAILY MULTIPLE VITAMINS TABS     EPINEPHrine (EPIPEN/ADRENACLICK/OR ANY BX GENERIC EQUIV) 0.3 MG/0.3ML injection 2-pack     lisinopril (ZESTRIL) 10 MG tablet     omeprazole (PRILOSEC) 20 MG DR capsule     rizatriptan (MAXALT) 10 MG  tablet     tiZANidine (ZANAFLEX) 4 MG tablet     vitamin (B COMPLEX-C) tablet     calcium carbonate (OS-PAULINA) 1500 (600 Ca) MG tablet     metoprolol succinate ER (TOPROL-XL) 25 MG 24 hr tablet     No current facility-administered medications for this visit.     Facility-Administered Medications Ordered in Other Visits   Medication     dexamethasone (DECADRON) injection 10 mg     iopamidol (ISOVUE-M 200) solution 20 mL     lidocaine (PF) (XYLOCAINE) 1 % injection 300 mg        Allergies   Allergen Reactions     Toradol Unknown     Wasps [Hornets] Hives, Itching and Swelling     Demerol Nausea and Vomiting     Sporanox [Itraconazole] Hives       Review of Systems   Constitutional: Negative.  Negative for chills, fatigue and fever.   Respiratory: Negative for cough, chest tightness, shortness of breath and wheezing.    Cardiovascular: Negative for chest pain, palpitations and peripheral edema.   Musculoskeletal: Positive for arthralgias and myalgias. Negative for back pain, gait problem, joint swelling, neck pain and neck stiffness.   Skin: Positive for color change and wound. Negative for pallor and rash.   All other systems reviewed and are negative.           Objective    BP (!) 142/91 (BP Location: Left arm, Patient Position: Sitting, Cuff Size: Adult Large)   Pulse 114   Temp 97.3  F (36.3  C) (Tympanic)   Resp 16   Wt 110.7 kg (244 lb)   SpO2 98%   BMI 33.09 kg/m    Body mass index is 33.09 kg/m .  Physical Exam  Constitutional:       General: He is not in acute distress.     Appearance: Normal appearance. He is normal weight. He is not ill-appearing.   Musculoskeletal:      Right hand: Swelling and tenderness (puncture wound present over the index finger with erythema present but no discharge) present. No deformity, lacerations or bony tenderness. Decreased range of motion. Normal strength. Normal sensation. There is no disruption of two-point discrimination. Normal capillary refill. Normal pulse.      Left  hand: No swelling, deformity, tenderness or bony tenderness. Normal strength. Normal sensation. There is no disruption of two-point discrimination. Normal capillary refill. Normal pulse.   Skin:     General: Skin is warm.      Capillary Refill: Capillary refill takes less than 2 seconds.      Findings: Abrasion and wound present.   Neurological:      General: No focal deficit present.      Mental Status: He is alert.      Sensory: Sensation is intact.      Deep Tendon Reflexes: Reflexes are normal and symmetric.   Psychiatric:         Mood and Affect: Mood normal.         Behavior: Behavior normal.         Thought Content: Thought content normal.         Judgment: Judgment normal.          Assessment/Plan:  Cat bite of index finger, initial encounter:  Cat is UTD on its vaccinations.  Will treat with [id number] and take with food/probiotics to minimize GI upset.  Tdap was also updated in clinic today as well.  Recommend tylenol/ibuprofen prn pain/fever and warm water soaks.   Rest, fluids, chicken soup.  Recheck in clinic if symptoms worsen or if symptoms do not improve.  To the ER if he develops flu like symptoms, worsening redness, swelling, drainage or fevers.  -     amoxicillin-clavulanate (AUGMENTIN) 875-125 MG tablet; Take 1 tablet by mouth 2 times daily for 10 days  -     TDAP VACCINE (Adacel, Boostrix)  [[id number]]        Karmen Dunbar PA-C

## 2021-11-13 ENCOUNTER — HEALTH MAINTENANCE LETTER (OUTPATIENT)
Age: 46
End: 2021-11-13

## 2022-01-08 ENCOUNTER — HEALTH MAINTENANCE LETTER (OUTPATIENT)
Age: 47
End: 2022-01-08

## 2022-08-15 ENCOUNTER — OFFICE VISIT (OUTPATIENT)
Dept: URGENT CARE | Facility: URGENT CARE | Age: 47
End: 2022-08-15
Payer: COMMERCIAL

## 2022-08-15 VITALS
OXYGEN SATURATION: 96 % | TEMPERATURE: 97.5 F | BODY MASS INDEX: 32.58 KG/M2 | HEIGHT: 73 IN | SYSTOLIC BLOOD PRESSURE: 135 MMHG | WEIGHT: 245.8 LBS | HEART RATE: 105 BPM | DIASTOLIC BLOOD PRESSURE: 87 MMHG

## 2022-08-15 DIAGNOSIS — J02.9 SORE THROAT: ICD-10-CM

## 2022-08-15 DIAGNOSIS — J01.00 ACUTE NON-RECURRENT MAXILLARY SINUSITIS: Primary | ICD-10-CM

## 2022-08-15 LAB — DEPRECATED S PYO AG THROAT QL EIA: NEGATIVE

## 2022-08-15 PROCEDURE — 87651 STREP A DNA AMP PROBE: CPT | Performed by: EMERGENCY MEDICINE

## 2022-08-15 PROCEDURE — 99213 OFFICE O/P EST LOW 20 MIN: CPT | Performed by: EMERGENCY MEDICINE

## 2022-08-15 RX ORDER — LISINOPRIL 10 MG/1
1 TABLET ORAL DAILY
COMMUNITY
Start: 2020-10-07 | End: 2022-12-08

## 2022-08-15 NOTE — PROGRESS NOTES
Assessment & Plan     Diagnosis:    (J01.00) Acute non-recurrent maxillary sinusitis  (primary encounter diagnosis)  Plan: amoxicillin-clavulanate (AUGMENTIN) 875-125 MG         tablet    (J02.9) Sore throat      Medical Decision Making:  Ramesh Galvez is a 47 year old male who presents to clinic today for evaluation of facial pain/pressure, sor throat and cough.  Signs and symptoms are consistent with sinusitis.  Discussed viral vs bacterial sinusitis with the patient.  URI symptoms noted include cough and rhinorrhea. No hx of seasonal allergies to suggest allergic rhinitis.  Strep testing is negative here.  Given duration of symptoms and presentation here, I discussed antibiotics for bacterial sinusitis. Outpatient medications ordered as noted above.  No evidence of fungal sinusitis, meningitis, encephalitis, cavernous sinus thrombosis, ocular pathology, intracerebral bleed, serious bacterial infection otherwise.  Supportive outpatient management indicated.  Patient verbalizes understanding and agreement with the plan including reasons to go to the ER. All questions answered.            ZINA Mckeon [facility]    Subjective     Ramesh Galvez is a 47 year old male who presents to clinic today for the following health issues:  Chief Complaint   Patient presents with     Sinus Problem     Pharyngitis     Cough       HPI    URI Adult    Onset of symptoms was 1 week ago.  Course of illness is worsening.    Severity moderate  Current and Associated symptoms: stuffy nose, cough - non-productive, ear pain bilateral, sore throat, facial pain/pressure and headache  Treatment measures tried include Tylenol/Ibuprofen, Decongestants and saline flush.  Predisposing factors include HX of chronic sinusitis.    Patient denies any fever, difficulty swallowing or breathing, chest pain, vision changes, hearing loss, history of ENT surgery.     Review of Systems    See HPI    Objective   "    Vitals: /87 (BP Location: Right arm, Patient Position: Sitting, Cuff Size: Adult Large)   Pulse 105   Temp 97.5  F (36.4  C) (Tympanic)   Ht 1.854 m (6' 1\")   Wt 111.5 kg (245 lb 12.8 oz)   SpO2 96%   BMI 32.43 kg/m    Resp: 15    Patient Vitals for the past 24 hrs:   BP Temp Temp src Pulse SpO2 Height Weight   08/15/22 1847 135/87 97.5  F (36.4  C) Tympanic 105 96 % 1.854 m (6' 1\") 111.5 kg (245 lb 12.8 oz)       Vital signs reviewed by: Myles Hoskins PA-C    Physical Exam   Constitutional: Patient is alert and cooperative. No acute distress=.  HENT:   Right Ear: External ear normal. TM is pale/grey.  Left Ear: External ear normal. TM is pale/grey.  Nose: Nasal turbinates are boggy and erythematous. There is green mucous noted in the right nares.  Right maxillary sinus tenderness to percussion.  Mouth: Normal tongue and tonsil. Posterior oropharynx is clear.  Eyes: Conjunctivae, EOMI and lids are normal. PERRL.  Cardiovascular: Regular rate and rhythm  Pulmonary/Chest: Lungs are clear to auscultation throughout. Effort normal. No respiratory distress. No wheezes, rales or rhonchi.  GI: Abdomen is soft and non-tender throughout.  Neurological: Alert and oriented x3. CN 3-7 and 9-12 intact.  Musculoskeletal: Normal range of motion. Normal tone.  Skin: No rash noted on visualized skin.  Psychiatric:The patient has a normal mood and affect.     Labs/Imaging:  Results for orders placed or performed in visit on 08/15/22   Streptococcus A Rapid Screen w/Reflex to PCR - Clinic Collect     Status: Normal    Specimen: Throat; Swab   Result Value Ref Range    Group A Strep antigen Negative Negative         Myles Hoskins PA-C, August 15, 2022      "

## 2022-08-16 LAB — GROUP A STREP BY PCR: NOT DETECTED

## 2022-11-20 ENCOUNTER — HEALTH MAINTENANCE LETTER (OUTPATIENT)
Age: 47
End: 2022-11-20

## 2023-04-15 ENCOUNTER — HEALTH MAINTENANCE LETTER (OUTPATIENT)
Age: 48
End: 2023-04-15

## 2024-06-16 ENCOUNTER — HEALTH MAINTENANCE LETTER (OUTPATIENT)
Age: 49
End: 2024-06-16